# Patient Record
Sex: FEMALE | Race: WHITE | NOT HISPANIC OR LATINO | Employment: OTHER | URBAN - NONMETROPOLITAN AREA
[De-identification: names, ages, dates, MRNs, and addresses within clinical notes are randomized per-mention and may not be internally consistent; named-entity substitution may affect disease eponyms.]

---

## 2024-03-21 ENCOUNTER — HOSPITAL ENCOUNTER (EMERGENCY)
Facility: HOSPITAL | Age: 89
Discharge: HOME | End: 2024-03-21
Attending: EMERGENCY MEDICINE
Payer: MEDICARE

## 2024-03-21 VITALS
WEIGHT: 152.12 LBS | HEART RATE: 91 BPM | SYSTOLIC BLOOD PRESSURE: 96 MMHG | TEMPERATURE: 98.1 F | HEIGHT: 66 IN | OXYGEN SATURATION: 100 % | RESPIRATION RATE: 16 BRPM | DIASTOLIC BLOOD PRESSURE: 56 MMHG | BODY MASS INDEX: 24.45 KG/M2

## 2024-03-21 DIAGNOSIS — M25.511 RIGHT SHOULDER PAIN, UNSPECIFIED CHRONICITY: Primary | ICD-10-CM

## 2024-03-21 PROCEDURE — 99281 EMR DPT VST MAYX REQ PHY/QHP: CPT | Performed by: EMERGENCY MEDICINE

## 2024-03-21 ASSESSMENT — PAIN DESCRIPTION - LOCATION: LOCATION: HAND

## 2024-03-21 ASSESSMENT — PAIN DESCRIPTION - FREQUENCY: FREQUENCY: CONSTANT/CONTINUOUS

## 2024-03-21 ASSESSMENT — PAIN - FUNCTIONAL ASSESSMENT: PAIN_FUNCTIONAL_ASSESSMENT: 0-10

## 2024-03-21 ASSESSMENT — PAIN DESCRIPTION - PAIN TYPE: TYPE: ACUTE PAIN

## 2024-03-21 ASSESSMENT — PAIN SCALES - GENERAL: PAINLEVEL_OUTOF10: 6

## 2024-03-21 NOTE — ED PROVIDER NOTES
HPI   Chief Complaint   Patient presents with    Wrist Injury     Sent by [facility] for a wrist fracture       HPI                    [place] Coma Scale Score: 15                     Patient History   No past medical history on file.  No past surgical history on file.  No family history on file.  Social History     Tobacco Use    Smoking status: Not on file    Smokeless tobacco: Not on file   Substance Use Topics    Alcohol use: Not on file    Drug use: Not on file       Physical Exam   ED Triage Vitals [03/21/24 1730]   Temperature Heart Rate Respirations BP   36.7 °C (98.1 °F) 91 16 96/56      Pulse Ox Temp Source Heart Rate Source Patient Position   100 % Temporal Monitor Sitting      BP Location FiO2 (%)     Left arm --       Physical Exam  Vitals and nursing note reviewed.   Constitutional:       General: She is not in acute distress.     Appearance: She is well-developed.   HENT:      Head: Normocephalic and atraumatic.   Eyes:      Conjunctiva/sclera: Conjunctivae normal.   Cardiovascular:      Rate and Rhythm: Normal rate and regular rhythm.      Heart sounds: No murmur heard.  Pulmonary:      Effort: Pulmonary effort is normal. No respiratory distress.      Breath sounds: Normal breath sounds.   Abdominal:      Palpations: Abdomen is soft.      Tenderness: There is no abdominal tenderness.   Musculoskeletal:         General: No swelling.      Cervical back: Neck supple.   Skin:     General: Skin is warm and dry.      Capillary Refill: Capillary refill takes less than 2 seconds.   Neurological:      Mental Status: She is alert.   Psychiatric:         Mood and Affect: Mood normal.         ED Course & MDM   Diagnoses as of 03/21/24 1751   Right shoulder pain, unspecified chronicity       Medical Decision Making  89-year-old female sent to the ER from [facility] secondary to a positive fracture on the outpatient x-rays.  According to the outpatient x-rays patient has a radial head fracture.  However  clinically patient has no tenderness at all she is able to probe pronate supinate externally rotate internally rotate complete range of motion of her right upper extremity.  Clinically has no point tenderness on the radial head.  Patient is able to grab my hand and pull me down to her.  Contacted orthopedics with a reported to me as long as the patient is not having any difficulty with range of motion or any indication of pain that there is no reason to reimage the patient.  Could give a arm sling for comfort but again with the clinical exam patient does not have evidence of any fracture.  Patient be discharged back to the unit.        Procedure  Procedures     Shree Burns, DO  03/21/24 3794

## 2024-04-03 ENCOUNTER — APPOINTMENT (OUTPATIENT)
Dept: RADIOLOGY | Facility: HOSPITAL | Age: 89
End: 2024-04-03
Payer: MEDICARE

## 2024-04-03 ENCOUNTER — APPOINTMENT (OUTPATIENT)
Dept: CARDIOLOGY | Facility: HOSPITAL | Age: 89
End: 2024-04-03
Payer: MEDICARE

## 2024-04-03 ENCOUNTER — HOSPITAL ENCOUNTER (EMERGENCY)
Facility: HOSPITAL | Age: 89
Discharge: OTHER INSTITUTION | End: 2024-04-04
Attending: STUDENT IN AN ORGANIZED HEALTH CARE EDUCATION/TRAINING PROGRAM
Payer: MEDICARE

## 2024-04-03 DIAGNOSIS — R79.89 ELEVATED TROPONIN: ICD-10-CM

## 2024-04-03 DIAGNOSIS — R53.1 GENERALIZED WEAKNESS: Primary | ICD-10-CM

## 2024-04-03 LAB
ALBUMIN SERPL BCP-MCNC: 3.2 G/DL (ref 3.4–5)
ALP SERPL-CCNC: 65 U/L (ref 33–136)
ALT SERPL W P-5'-P-CCNC: 29 U/L (ref 7–45)
ANION GAP SERPL CALC-SCNC: 11 MMOL/L (ref 10–20)
APPEARANCE UR: ABNORMAL
AST SERPL W P-5'-P-CCNC: 54 U/L (ref 9–39)
BASOPHILS # BLD AUTO: 0.07 X10*3/UL (ref 0–0.1)
BASOPHILS NFR BLD AUTO: 0.4 %
BILIRUB SERPL-MCNC: 0.4 MG/DL (ref 0–1.2)
BILIRUB UR STRIP.AUTO-MCNC: NEGATIVE MG/DL
BNP SERPL-MCNC: 557 PG/ML (ref 0–99)
BUN SERPL-MCNC: 35 MG/DL (ref 6–23)
CALCIUM SERPL-MCNC: 9.2 MG/DL (ref 8.6–10.3)
CARDIAC TROPONIN I PNL SERPL HS: 241 NG/L (ref 0–13)
CARDIAC TROPONIN I PNL SERPL HS: 304 NG/L (ref 0–13)
CARDIAC TROPONIN I PNL SERPL HS: 337 NG/L (ref 0–13)
CHLORIDE SERPL-SCNC: 105 MMOL/L (ref 98–107)
CO2 SERPL-SCNC: 30 MMOL/L (ref 21–32)
COLOR UR: YELLOW
CREAT SERPL-MCNC: 0.95 MG/DL (ref 0.5–1.05)
EGFRCR SERPLBLD CKD-EPI 2021: 57 ML/MIN/1.73M*2
EOSINOPHIL # BLD AUTO: 0.04 X10*3/UL (ref 0–0.4)
EOSINOPHIL NFR BLD AUTO: 0.2 %
ERYTHROCYTE [DISTWIDTH] IN BLOOD BY AUTOMATED COUNT: 15 % (ref 11.5–14.5)
FLUAV RNA RESP QL NAA+PROBE: NOT DETECTED
FLUBV RNA RESP QL NAA+PROBE: NOT DETECTED
GLUCOSE SERPL-MCNC: 128 MG/DL (ref 74–99)
GLUCOSE UR STRIP.AUTO-MCNC: NEGATIVE MG/DL
HCT VFR BLD AUTO: 31.9 % (ref 36–46)
HGB BLD-MCNC: 10.1 G/DL (ref 12–16)
HOLD SPECIMEN: NORMAL
IMM GRANULOCYTES # BLD AUTO: 0.07 X10*3/UL (ref 0–0.5)
IMM GRANULOCYTES NFR BLD AUTO: 0.4 % (ref 0–0.9)
INR PPP: 1.4 (ref 0.9–1.1)
KETONES UR STRIP.AUTO-MCNC: NEGATIVE MG/DL
LACTATE SERPL-SCNC: 1.8 MMOL/L (ref 0.4–2)
LACTATE SERPL-SCNC: 2.3 MMOL/L (ref 0.4–2)
LEUKOCYTE ESTERASE UR QL STRIP.AUTO: NEGATIVE
LIPASE SERPL-CCNC: 4 U/L (ref 9–82)
LYMPHOCYTES # BLD AUTO: 0.97 X10*3/UL (ref 0.8–3)
LYMPHOCYTES NFR BLD AUTO: 5.9 %
MAGNESIUM SERPL-MCNC: 1.06 MG/DL (ref 1.6–2.4)
MCH RBC QN AUTO: 28.4 PG (ref 26–34)
MCHC RBC AUTO-ENTMCNC: 31.7 G/DL (ref 32–36)
MCV RBC AUTO: 90 FL (ref 80–100)
MONOCYTES # BLD AUTO: 1.04 X10*3/UL (ref 0.05–0.8)
MONOCYTES NFR BLD AUTO: 6.3 %
NEUTROPHILS # BLD AUTO: 14.32 X10*3/UL (ref 1.6–5.5)
NEUTROPHILS NFR BLD AUTO: 86.8 %
NITRITE UR QL STRIP.AUTO: NEGATIVE
NRBC BLD-RTO: 0 /100 WBCS (ref 0–0)
PH UR STRIP.AUTO: 5 [PH]
PLATELET # BLD AUTO: 332 X10*3/UL (ref 150–450)
POTASSIUM SERPL-SCNC: 3.1 MMOL/L (ref 3.5–5.3)
PROT SERPL-MCNC: 6.1 G/DL (ref 6.4–8.2)
PROT UR STRIP.AUTO-MCNC: NEGATIVE MG/DL
PROTHROMBIN TIME: 15.9 SECONDS (ref 9.8–12.8)
RBC # BLD AUTO: 3.56 X10*6/UL (ref 4–5.2)
RBC # UR STRIP.AUTO: NEGATIVE /UL
SARS-COV-2 RNA RESP QL NAA+PROBE: NOT DETECTED
SODIUM SERPL-SCNC: 143 MMOL/L (ref 136–145)
SP GR UR STRIP.AUTO: 1.01
UROBILINOGEN UR STRIP.AUTO-MCNC: <2 MG/DL
WBC # BLD AUTO: 16.5 X10*3/UL (ref 4.4–11.3)

## 2024-04-03 PROCEDURE — 80053 COMPREHEN METABOLIC PANEL: CPT | Performed by: HEALTH CARE PROVIDER

## 2024-04-03 PROCEDURE — 96367 TX/PROPH/DG ADDL SEQ IV INF: CPT

## 2024-04-03 PROCEDURE — 85610 PROTHROMBIN TIME: CPT | Performed by: HEALTH CARE PROVIDER

## 2024-04-03 PROCEDURE — 96366 THER/PROPH/DIAG IV INF ADDON: CPT

## 2024-04-03 PROCEDURE — 93005 ELECTROCARDIOGRAM TRACING: CPT

## 2024-04-03 PROCEDURE — 96368 THER/DIAG CONCURRENT INF: CPT | Mod: 59

## 2024-04-03 PROCEDURE — 85025 COMPLETE CBC W/AUTO DIFF WBC: CPT | Performed by: HEALTH CARE PROVIDER

## 2024-04-03 PROCEDURE — 81003 URINALYSIS AUTO W/O SCOPE: CPT | Performed by: HEALTH CARE PROVIDER

## 2024-04-03 PROCEDURE — 2500000004 HC RX 250 GENERAL PHARMACY W/ HCPCS (ALT 636 FOR OP/ED): Performed by: HEALTH CARE PROVIDER

## 2024-04-03 PROCEDURE — 71275 CT ANGIOGRAPHY CHEST: CPT | Mod: FOREIGN READ | Performed by: RADIOLOGY

## 2024-04-03 PROCEDURE — 71045 X-RAY EXAM CHEST 1 VIEW: CPT | Mod: FOREIGN READ | Performed by: RADIOLOGY

## 2024-04-03 PROCEDURE — 96365 THER/PROPH/DIAG IV INF INIT: CPT | Mod: 59

## 2024-04-03 PROCEDURE — 96375 TX/PRO/DX INJ NEW DRUG ADDON: CPT | Mod: 59

## 2024-04-03 PROCEDURE — 83605 ASSAY OF LACTIC ACID: CPT | Performed by: HEALTH CARE PROVIDER

## 2024-04-03 PROCEDURE — 36415 COLL VENOUS BLD VENIPUNCTURE: CPT | Performed by: HEALTH CARE PROVIDER

## 2024-04-03 PROCEDURE — 96361 HYDRATE IV INFUSION ADD-ON: CPT

## 2024-04-03 PROCEDURE — 84484 ASSAY OF TROPONIN QUANT: CPT | Performed by: HEALTH CARE PROVIDER

## 2024-04-03 PROCEDURE — 83735 ASSAY OF MAGNESIUM: CPT | Performed by: HEALTH CARE PROVIDER

## 2024-04-03 PROCEDURE — 74177 CT ABD & PELVIS W/CONTRAST: CPT

## 2024-04-03 PROCEDURE — 71045 X-RAY EXAM CHEST 1 VIEW: CPT

## 2024-04-03 PROCEDURE — 74177 CT ABD & PELVIS W/CONTRAST: CPT | Mod: FOREIGN READ | Performed by: RADIOLOGY

## 2024-04-03 PROCEDURE — P9612 CATHETERIZE FOR URINE SPEC: HCPCS

## 2024-04-03 PROCEDURE — 83880 ASSAY OF NATRIURETIC PEPTIDE: CPT | Performed by: HEALTH CARE PROVIDER

## 2024-04-03 PROCEDURE — 83690 ASSAY OF LIPASE: CPT | Performed by: HEALTH CARE PROVIDER

## 2024-04-03 PROCEDURE — 87040 BLOOD CULTURE FOR BACTERIA: CPT | Mod: GENLAB | Performed by: HEALTH CARE PROVIDER

## 2024-04-03 PROCEDURE — 2550000001 HC RX 255 CONTRASTS: Performed by: HEALTH CARE PROVIDER

## 2024-04-03 PROCEDURE — 71275 CT ANGIOGRAPHY CHEST: CPT

## 2024-04-03 PROCEDURE — 84484 ASSAY OF TROPONIN QUANT: CPT | Performed by: STUDENT IN AN ORGANIZED HEALTH CARE EDUCATION/TRAINING PROGRAM

## 2024-04-03 PROCEDURE — 99285 EMERGENCY DEPT VISIT HI MDM: CPT | Mod: 25

## 2024-04-03 PROCEDURE — 87636 SARSCOV2 & INF A&B AMP PRB: CPT | Performed by: HEALTH CARE PROVIDER

## 2024-04-03 RX ORDER — GLIPIZIDE 5 MG/1
5 TABLET ORAL DAILY
COMMUNITY

## 2024-04-03 RX ORDER — LIDOCAINE 560 MG/1
1 PATCH PERCUTANEOUS; TOPICAL; TRANSDERMAL DAILY
COMMUNITY

## 2024-04-03 RX ORDER — BUSPIRONE HYDROCHLORIDE 10 MG/1
10 TABLET ORAL DAILY
Status: ON HOLD | COMMUNITY
End: 2024-04-04 | Stop reason: WASHOUT

## 2024-04-03 RX ORDER — KETOROLAC TROMETHAMINE 15 MG/ML
7.5 INJECTION, SOLUTION INTRAMUSCULAR; INTRAVENOUS ONCE
Status: COMPLETED | OUTPATIENT
Start: 2024-04-03 | End: 2024-04-03

## 2024-04-03 RX ORDER — PANTOPRAZOLE SODIUM 20 MG/1
20 TABLET, DELAYED RELEASE ORAL
COMMUNITY

## 2024-04-03 RX ORDER — POTASSIUM CHLORIDE 14.9 MG/ML
20 INJECTION INTRAVENOUS
Status: COMPLETED | OUTPATIENT
Start: 2024-04-03 | End: 2024-04-03

## 2024-04-03 RX ORDER — LOSARTAN POTASSIUM 25 MG/1
25 TABLET ORAL DAILY
COMMUNITY

## 2024-04-03 RX ORDER — FUROSEMIDE 40 MG/1
40 TABLET ORAL DAILY
COMMUNITY

## 2024-04-03 RX ORDER — BUSPIRONE HYDROCHLORIDE 10 MG/1
20 TABLET ORAL 2 TIMES DAILY
COMMUNITY

## 2024-04-03 RX ORDER — SUCRALFATE 1 G/1
1 TABLET ORAL
COMMUNITY

## 2024-04-03 RX ORDER — TRAZODONE HYDROCHLORIDE 50 MG/1
50 TABLET ORAL NIGHTLY
COMMUNITY

## 2024-04-03 RX ORDER — TALC
3 POWDER (GRAM) TOPICAL NIGHTLY
COMMUNITY

## 2024-04-03 RX ORDER — METFORMIN HYDROCHLORIDE 1000 MG/1
1000 TABLET ORAL
COMMUNITY

## 2024-04-03 RX ORDER — SERTRALINE HYDROCHLORIDE 50 MG/1
50 TABLET, FILM COATED ORAL DAILY
COMMUNITY

## 2024-04-03 RX ORDER — VANCOMYCIN HYDROCHLORIDE 1 G/200ML
1000 INJECTION, SOLUTION INTRAVENOUS ONCE
Status: COMPLETED | OUTPATIENT
Start: 2024-04-03 | End: 2024-04-03

## 2024-04-03 RX ORDER — QUETIAPINE FUMARATE 50 MG/1
50 TABLET, FILM COATED ORAL 2 TIMES DAILY
COMMUNITY

## 2024-04-03 RX ORDER — MAGNESIUM SULFATE HEPTAHYDRATE 40 MG/ML
2 INJECTION, SOLUTION INTRAVENOUS ONCE
Status: COMPLETED | OUTPATIENT
Start: 2024-04-03 | End: 2024-04-03

## 2024-04-03 RX ADMIN — IOHEXOL 75 ML: 350 INJECTION, SOLUTION INTRAVENOUS at 13:39

## 2024-04-03 RX ADMIN — SODIUM CHLORIDE, POTASSIUM CHLORIDE, SODIUM LACTATE AND CALCIUM CHLORIDE 500 ML: 600; 310; 30; 20 INJECTION, SOLUTION INTRAVENOUS at 11:52

## 2024-04-03 RX ADMIN — MAGNESIUM SULFATE IN WATER 2 G: 40 INJECTION, SOLUTION INTRAVENOUS at 13:12

## 2024-04-03 RX ADMIN — POTASSIUM CHLORIDE 20 MEQ: 14.9 INJECTION, SOLUTION INTRAVENOUS at 13:09

## 2024-04-03 RX ADMIN — POTASSIUM CHLORIDE 20 MEQ: 14.9 INJECTION, SOLUTION INTRAVENOUS at 15:36

## 2024-04-03 RX ADMIN — KETOROLAC TROMETHAMINE 7.5 MG: 15 INJECTION, SOLUTION INTRAMUSCULAR; INTRAVENOUS at 21:25

## 2024-04-03 RX ADMIN — VANCOMYCIN HYDROCHLORIDE 1000 MG: 1 INJECTION, SOLUTION INTRAVENOUS at 13:50

## 2024-04-03 ASSESSMENT — COLUMBIA-SUICIDE SEVERITY RATING SCALE - C-SSRS
2. HAVE YOU ACTUALLY HAD ANY THOUGHTS OF KILLING YOURSELF?: NO
6. HAVE YOU EVER DONE ANYTHING, STARTED TO DO ANYTHING, OR PREPARED TO DO ANYTHING TO END YOUR LIFE?: NO
1. IN THE PAST MONTH, HAVE YOU WISHED YOU WERE DEAD OR WISHED YOU COULD GO TO SLEEP AND NOT WAKE UP?: NO

## 2024-04-03 ASSESSMENT — PAIN - FUNCTIONAL ASSESSMENT
PAIN_FUNCTIONAL_ASSESSMENT: 0-10
PAIN_FUNCTIONAL_ASSESSMENT: UNABLE TO SELF-REPORT

## 2024-04-03 ASSESSMENT — PAIN SCALES - GENERAL: PAINLEVEL_OUTOF10: 0 - NO PAIN

## 2024-04-03 NOTE — ED PROVIDER NOTES
HPI   Chief Complaint   Patient presents with    Abdominal Pain    abnormal labs-high neutrophil       CC: Increased lethargy  HPI:   This is a 89-year-old female from [facility] inpatient psychiatry facility for increased lethargy possible abdominal tenderness and outpatient laboratory finding for increased leukocytosis.  Patient has a history of dementia with psychosis, she was placed at [facility] on 3/12/2024 per her EMR she has a history of renal cell carcinoma with partial nephrectomy, non-insulin-dependent type 2 diabetes, prior cholecystectomy, she has a history of Crohn's, A-fib, she is normally ANO x 1 which is her normal baseline.      Additional Limitations to History:   External Records Reviewed: I reviewed recent and relevant outside records including   History Obtained From:     Past Medical History: Per HPI  Medications: Reviewed in EMR and with patient  Allergies:  Reviewed in EMR  Past Surgical History:   Social History:     ------------------------------------------------------------------------------------------------------  Physical Exam:  --Vital signs reviewed in nursing triage note, EMR flow sheets, and at patient's bedside  GEN:  A&Ox3, no acute distress, appears comfortable.   EYES: EOMI, non-injected sclera.  ENT: Moist mucous membranes, no apparent injuries or lesions.   CARDIO: Normal rate and regular rhythm. No murmurs, rubs, or gallops.  2+ equal pulses of the distal extremities.   PULM: Clear to auscultation bilaterally. No rales, rhonchi, or wheezes. Good symmetric chest expansion.  GI: Soft, non-tender, non-distended. No rebound tenderness or guarding.  SKIN: Warm and dry, no rashes or lesions.  MSK: ROM intact the extremities without contractures.   EXT: No peripheral edema, contusions, or wounds.   NEURO:    Medical Decision Making:  Patient seen and evaluated by ED attending. On arrival the patient     Differential Diagnoses Considered:   Chronic Medical Conditions  Significantly Affecting Care:   Diagnostic testing considered: [PERC, D-Dimer, PECARN, etc.]    - EKG interpreted by myself (atrial fibrillation rate controlled at 97 QRS duration 124 no prolonged QT/QTc no obvious ST elevation, depression or acute ischemic findings.  - I independently interpreted: [CXR, CT, POCUS, etc. including your interpretation]  - Labs notable for     Escalation of Care: Appropriate for   Social Determinants of Health Significantly Affecting Care: [Homelessness, lacking transportation, uninsured, unable to afford medications]  Prescription Drug Consideration: [Antibiotics, antivirals, pain medications, etc.]  Discussion of Management with Other Providers:  I discussed the patient/results with: [admitting team, consultant, radiologist, social work, EPAT, case management, PT/OT, RT, PCP, etc.]      Camden cha I now it is                          [place] Coma Scale Score: 13                     Patient History   No past medical history on file.  No past surgical history on file.  No family history on file.  Social History     Tobacco Use    Smoking status: Not on file    Smokeless tobacco: Not on file   Substance Use Topics    Alcohol use: Not on file    Drug use: Not on file       Physical Exam   ED Triage Vitals [04/03/24 1123]   Temperature Heart Rate Respirations BP   35.8 °C (96.5 °F) 97 19 117/70      Pulse Ox Temp Source Heart Rate Source Patient Position   96 % Temporal -- --      BP Location FiO2 (%)     -- --       Physical Exam    ED Course & MDM   Diagnoses as of 04/04/24 1322   Generalized weakness   Elevated troponin       Medical Decision Making  89-year-old female from geriatric long-term nursing facility with nonspecific complaints of abdominal pain, abnormal findings on laboratory workup.  Family is at bedside was able to provide some more information, workup here in the ED showed leukocytosis over 16,000, elevated troponins, negative influenza COVID, her troponins  are trending downward, electrolyte imbalance with hypomagnesia him hypokalemia elevated BNP CT imaging showing moderate amount of stool, fecal impaction, CT chest negative for PEs or pneumonia urine also appeared to be unremarkable.  No acute ischemic changes noted on ECG, patient will need to be transferred to higher level of care unable to keep geriatric psychiatric patient at this facility, report given to Dr. Caban        Procedure  Procedures     Camden Monk PA-C  04/04/24 5429

## 2024-04-04 ENCOUNTER — TELEPHONE (OUTPATIENT)
Dept: EMERGENCY MEDICINE | Facility: HOSPITAL | Age: 89
End: 2024-04-04
Payer: MEDICARE

## 2024-04-04 ENCOUNTER — HOSPITAL ENCOUNTER (OUTPATIENT)
Facility: HOSPITAL | Age: 89
Setting detail: OBSERVATION
Discharge: PSYCHIATRIC OR SUBSTANCE USE FACILITY | End: 2024-04-05
Attending: INTERNAL MEDICINE | Admitting: INTERNAL MEDICINE
Payer: MEDICARE

## 2024-04-04 VITALS
OXYGEN SATURATION: 95 % | RESPIRATION RATE: 23 BRPM | HEIGHT: 66 IN | HEART RATE: 103 BPM | SYSTOLIC BLOOD PRESSURE: 111 MMHG | BODY MASS INDEX: 24.45 KG/M2 | DIASTOLIC BLOOD PRESSURE: 65 MMHG | TEMPERATURE: 98.1 F | WEIGHT: 152.12 LBS

## 2024-04-04 DIAGNOSIS — K50.919 CROHN'S DISEASE WITH COMPLICATION, UNSPECIFIED GASTROINTESTINAL TRACT LOCATION (MULTI): Primary | ICD-10-CM

## 2024-04-04 DIAGNOSIS — K59.00 CONSTIPATION, UNSPECIFIED CONSTIPATION TYPE: ICD-10-CM

## 2024-04-04 PROBLEM — E86.0 DEHYDRATION: Status: ACTIVE | Noted: 2024-04-04

## 2024-04-04 LAB
ANION GAP SERPL CALC-SCNC: 12 MMOL/L (ref 10–20)
BUN SERPL-MCNC: 25 MG/DL (ref 6–23)
CALCIUM SERPL-MCNC: 8.6 MG/DL (ref 8.6–10.3)
CHLORIDE SERPL-SCNC: 104 MMOL/L (ref 98–107)
CO2 SERPL-SCNC: 29 MMOL/L (ref 21–32)
CREAT SERPL-MCNC: 0.75 MG/DL (ref 0.5–1.05)
EGFRCR SERPLBLD CKD-EPI 2021: 75 ML/MIN/1.73M*2
ERYTHROCYTE [DISTWIDTH] IN BLOOD BY AUTOMATED COUNT: 14.8 % (ref 11.5–14.5)
EST. AVERAGE GLUCOSE BLD GHB EST-MCNC: 131 MG/DL
GLUCOSE BLD MANUAL STRIP-MCNC: 106 MG/DL (ref 74–99)
GLUCOSE BLD MANUAL STRIP-MCNC: 131 MG/DL (ref 74–99)
GLUCOSE BLD MANUAL STRIP-MCNC: 171 MG/DL (ref 74–99)
GLUCOSE BLD MANUAL STRIP-MCNC: 92 MG/DL (ref 74–99)
GLUCOSE SERPL-MCNC: 103 MG/DL (ref 74–99)
HBA1C MFR BLD: 6.2 %
HCT VFR BLD AUTO: 31.3 % (ref 36–46)
HGB BLD-MCNC: 9.9 G/DL (ref 12–16)
MAGNESIUM SERPL-MCNC: 2.59 MG/DL (ref 1.6–2.4)
MCH RBC QN AUTO: 28.6 PG (ref 26–34)
MCHC RBC AUTO-ENTMCNC: 31.6 G/DL (ref 32–36)
MCV RBC AUTO: 91 FL (ref 80–100)
NRBC BLD-RTO: 0 /100 WBCS (ref 0–0)
PHOSPHATE SERPL-MCNC: 2.1 MG/DL (ref 2.5–4.9)
PLATELET # BLD AUTO: 316 X10*3/UL (ref 150–450)
POTASSIUM SERPL-SCNC: 2.9 MMOL/L (ref 3.5–5.3)
RBC # BLD AUTO: 3.46 X10*6/UL (ref 4–5.2)
SODIUM SERPL-SCNC: 142 MMOL/L (ref 136–145)
WBC # BLD AUTO: 11.6 X10*3/UL (ref 4.4–11.3)

## 2024-04-04 PROCEDURE — 83735 ASSAY OF MAGNESIUM: CPT

## 2024-04-04 PROCEDURE — G0378 HOSPITAL OBSERVATION PER HR: HCPCS

## 2024-04-04 PROCEDURE — 2500000004 HC RX 250 GENERAL PHARMACY W/ HCPCS (ALT 636 FOR OP/ED)

## 2024-04-04 PROCEDURE — 2500000001 HC RX 250 WO HCPCS SELF ADMINISTERED DRUGS (ALT 637 FOR MEDICARE OP)

## 2024-04-04 PROCEDURE — 96376 TX/PRO/DX INJ SAME DRUG ADON: CPT

## 2024-04-04 PROCEDURE — 80048 BASIC METABOLIC PNL TOTAL CA: CPT

## 2024-04-04 PROCEDURE — 36415 COLL VENOUS BLD VENIPUNCTURE: CPT

## 2024-04-04 PROCEDURE — 84100 ASSAY OF PHOSPHORUS: CPT

## 2024-04-04 PROCEDURE — 85027 COMPLETE CBC AUTOMATED: CPT

## 2024-04-04 PROCEDURE — 96372 THER/PROPH/DIAG INJ SC/IM: CPT | Mod: 59

## 2024-04-04 PROCEDURE — 92610 EVALUATE SWALLOWING FUNCTION: CPT | Mod: GN

## 2024-04-04 PROCEDURE — 99222 1ST HOSP IP/OBS MODERATE 55: CPT | Performed by: STUDENT IN AN ORGANIZED HEALTH CARE EDUCATION/TRAINING PROGRAM

## 2024-04-04 PROCEDURE — 2500000005 HC RX 250 GENERAL PHARMACY W/O HCPCS

## 2024-04-04 PROCEDURE — 2500000004 HC RX 250 GENERAL PHARMACY W/ HCPCS (ALT 636 FOR OP/ED): Performed by: INTERNAL MEDICINE

## 2024-04-04 PROCEDURE — 2500000002 HC RX 250 W HCPCS SELF ADMINISTERED DRUGS (ALT 637 FOR MEDICARE OP, ALT 636 FOR OP/ED)

## 2024-04-04 PROCEDURE — 96366 THER/PROPH/DIAG IV INF ADDON: CPT

## 2024-04-04 PROCEDURE — 96375 TX/PRO/DX INJ NEW DRUG ADDON: CPT

## 2024-04-04 PROCEDURE — 82947 ASSAY GLUCOSE BLOOD QUANT: CPT | Mod: 59

## 2024-04-04 PROCEDURE — 83036 HEMOGLOBIN GLYCOSYLATED A1C: CPT | Mod: GEALAB

## 2024-04-04 PROCEDURE — 96367 TX/PROPH/DG ADDL SEQ IV INF: CPT

## 2024-04-04 PROCEDURE — 96365 THER/PROPH/DIAG IV INF INIT: CPT

## 2024-04-04 RX ORDER — METFORMIN HYDROCHLORIDE 500 MG/1
1000 TABLET, EXTENDED RELEASE ORAL
Status: DISCONTINUED | OUTPATIENT
Start: 2024-04-04 | End: 2024-04-05

## 2024-04-04 RX ORDER — DEXTROSE 50 % IN WATER (D50W) INTRAVENOUS SYRINGE
25
Status: DISCONTINUED | OUTPATIENT
Start: 2024-04-04 | End: 2024-04-05 | Stop reason: HOSPADM

## 2024-04-04 RX ORDER — BUSPIRONE HYDROCHLORIDE 10 MG/1
10 TABLET ORAL EVERY EVENING
COMMUNITY

## 2024-04-04 RX ORDER — MAGNESIUM SULFATE HEPTAHYDRATE 40 MG/ML
4 INJECTION, SOLUTION INTRAVENOUS ONCE
Status: COMPLETED | OUTPATIENT
Start: 2024-04-04 | End: 2024-04-04

## 2024-04-04 RX ORDER — AMOXICILLIN 250 MG
2 CAPSULE ORAL 2 TIMES DAILY
Status: DISCONTINUED | OUTPATIENT
Start: 2024-04-04 | End: 2024-04-05 | Stop reason: HOSPADM

## 2024-04-04 RX ORDER — INSULIN LISPRO 100 [IU]/ML
0-15 INJECTION, SOLUTION INTRAVENOUS; SUBCUTANEOUS
Status: DISCONTINUED | OUTPATIENT
Start: 2024-04-04 | End: 2024-04-05 | Stop reason: HOSPADM

## 2024-04-04 RX ORDER — METOPROLOL TARTRATE 1 MG/ML
5 INJECTION, SOLUTION INTRAVENOUS ONCE
Status: COMPLETED | OUTPATIENT
Start: 2024-04-04 | End: 2024-04-04

## 2024-04-04 RX ORDER — SERTRALINE HYDROCHLORIDE 50 MG/1
50 TABLET, FILM COATED ORAL DAILY
Status: DISCONTINUED | OUTPATIENT
Start: 2024-04-04 | End: 2024-04-05 | Stop reason: HOSPADM

## 2024-04-04 RX ORDER — POLYETHYLENE GLYCOL 3350 17 G/17G
17 POWDER, FOR SOLUTION ORAL DAILY
Status: DISCONTINUED | OUTPATIENT
Start: 2024-04-04 | End: 2024-04-05 | Stop reason: HOSPADM

## 2024-04-04 RX ORDER — SUCRALFATE 1 G/1
1 TABLET ORAL
Status: DISCONTINUED | OUTPATIENT
Start: 2024-04-04 | End: 2024-04-05 | Stop reason: HOSPADM

## 2024-04-04 RX ORDER — LOSARTAN POTASSIUM 50 MG/1
25 TABLET ORAL DAILY
Status: DISCONTINUED | OUTPATIENT
Start: 2024-04-04 | End: 2024-04-05 | Stop reason: HOSPADM

## 2024-04-04 RX ORDER — POTASSIUM CHLORIDE 14.9 MG/ML
20 INJECTION INTRAVENOUS
Status: COMPLETED | OUTPATIENT
Start: 2024-04-04 | End: 2024-04-04

## 2024-04-04 RX ORDER — POTASSIUM CHLORIDE 20 MEQ/1
40 TABLET, EXTENDED RELEASE ORAL 3 TIMES DAILY
Status: DISCONTINUED | OUTPATIENT
Start: 2024-04-04 | End: 2024-04-04

## 2024-04-04 RX ORDER — ENOXAPARIN SODIUM 100 MG/ML
40 INJECTION SUBCUTANEOUS EVERY 24 HOURS
Status: DISCONTINUED | OUTPATIENT
Start: 2024-04-04 | End: 2024-04-05 | Stop reason: HOSPADM

## 2024-04-04 RX ORDER — LORAZEPAM 2 MG/ML
0.5 INJECTION INTRAMUSCULAR ONCE
Status: COMPLETED | OUTPATIENT
Start: 2024-04-04 | End: 2024-04-04

## 2024-04-04 RX ORDER — BISACODYL 5 MG
10 TABLET, DELAYED RELEASE (ENTERIC COATED) ORAL DAILY PRN
Status: DISCONTINUED | OUTPATIENT
Start: 2024-04-04 | End: 2024-04-05 | Stop reason: HOSPADM

## 2024-04-04 RX ORDER — ONDANSETRON 4 MG/1
4 TABLET, ORALLY DISINTEGRATING ORAL EVERY 8 HOURS PRN
Status: DISCONTINUED | OUTPATIENT
Start: 2024-04-04 | End: 2024-04-05 | Stop reason: HOSPADM

## 2024-04-04 RX ORDER — QUETIAPINE FUMARATE 25 MG/1
50 TABLET, FILM COATED ORAL 2 TIMES DAILY
Status: DISCONTINUED | OUTPATIENT
Start: 2024-04-04 | End: 2024-04-05 | Stop reason: HOSPADM

## 2024-04-04 RX ORDER — BUSPIRONE HYDROCHLORIDE 10 MG/1
10 TABLET ORAL EVERY EVENING
Status: DISCONTINUED | OUTPATIENT
Start: 2024-04-04 | End: 2024-04-05 | Stop reason: HOSPADM

## 2024-04-04 RX ORDER — GLIPIZIDE 5 MG/1
5 TABLET, FILM COATED, EXTENDED RELEASE ORAL DAILY
Status: DISCONTINUED | OUTPATIENT
Start: 2024-04-04 | End: 2024-04-05

## 2024-04-04 RX ORDER — OLANZAPINE 10 MG/2ML
5 INJECTION, POWDER, FOR SOLUTION INTRAMUSCULAR ONCE
Status: COMPLETED | OUTPATIENT
Start: 2024-04-04 | End: 2024-04-04

## 2024-04-04 RX ORDER — FUROSEMIDE 40 MG/1
40 TABLET ORAL DAILY
Status: DISCONTINUED | OUTPATIENT
Start: 2024-04-04 | End: 2024-04-05 | Stop reason: HOSPADM

## 2024-04-04 RX ORDER — POLYETHYLENE GLYCOL 3350 17 G/17G
17 POWDER, FOR SOLUTION ORAL DAILY
Status: DISCONTINUED | OUTPATIENT
Start: 2024-04-04 | End: 2024-04-04

## 2024-04-04 RX ORDER — TRAZODONE HYDROCHLORIDE 50 MG/1
50 TABLET ORAL NIGHTLY
Status: DISCONTINUED | OUTPATIENT
Start: 2024-04-04 | End: 2024-04-05 | Stop reason: HOSPADM

## 2024-04-04 RX ORDER — DEXTROSE 50 % IN WATER (D50W) INTRAVENOUS SYRINGE
12.5
Status: DISCONTINUED | OUTPATIENT
Start: 2024-04-04 | End: 2024-04-05 | Stop reason: HOSPADM

## 2024-04-04 RX ORDER — BUSPIRONE HYDROCHLORIDE 10 MG/1
20 TABLET ORAL 2 TIMES DAILY
Status: DISCONTINUED | OUTPATIENT
Start: 2024-04-04 | End: 2024-04-05 | Stop reason: HOSPADM

## 2024-04-04 RX ORDER — TALC
3 POWDER (GRAM) TOPICAL DAILY
Status: DISCONTINUED | OUTPATIENT
Start: 2024-04-04 | End: 2024-04-05

## 2024-04-04 RX ORDER — ONDANSETRON 4 MG/1
4 TABLET, ORALLY DISINTEGRATING ORAL EVERY 4 HOURS PRN
COMMUNITY

## 2024-04-04 RX ORDER — INSULIN LISPRO 100 [IU]/ML
0-10 INJECTION, SOLUTION INTRAVENOUS; SUBCUTANEOUS
COMMUNITY

## 2024-04-04 RX ORDER — PANTOPRAZOLE SODIUM 20 MG/1
20 TABLET, DELAYED RELEASE ORAL
Status: DISCONTINUED | OUTPATIENT
Start: 2024-04-04 | End: 2024-04-05 | Stop reason: HOSPADM

## 2024-04-04 RX ADMIN — MAGNESIUM SULFATE HEPTAHYDRATE 4 G: 4 INJECTION, SOLUTION INTRAVENOUS at 02:34

## 2024-04-04 RX ADMIN — LORAZEPAM 0.5 MG: 2 INJECTION INTRAMUSCULAR; INTRAVENOUS at 04:28

## 2024-04-04 RX ADMIN — ENOXAPARIN SODIUM 40 MG: 40 INJECTION SUBCUTANEOUS at 02:34

## 2024-04-04 RX ADMIN — POLYETHYLENE GLYCOL 3350 17 G: 17 POWDER, FOR SOLUTION ORAL at 02:34

## 2024-04-04 RX ADMIN — METOPROLOL TARTRATE 5 MG: 5 INJECTION INTRAVENOUS at 22:26

## 2024-04-04 RX ADMIN — TRAZODONE HYDROCHLORIDE 50 MG: 50 TABLET ORAL at 20:28

## 2024-04-04 RX ADMIN — SENNOSIDES AND DOCUSATE SODIUM 2 TABLET: 8.6; 5 TABLET ORAL at 20:28

## 2024-04-04 RX ADMIN — BUSPIRONE HYDROCHLORIDE 20 MG: 10 TABLET ORAL at 20:27

## 2024-04-04 RX ADMIN — METOPROLOL TARTRATE 5 MG: 5 INJECTION INTRAVENOUS at 17:17

## 2024-04-04 RX ADMIN — POTASSIUM CHLORIDE 20 MEQ: 14.9 INJECTION, SOLUTION INTRAVENOUS at 09:22

## 2024-04-04 RX ADMIN — QUETIAPINE FUMARATE 50 MG: 25 TABLET ORAL at 20:27

## 2024-04-04 RX ADMIN — QUETIAPINE FUMARATE 50 MG: 25 TABLET ORAL at 02:34

## 2024-04-04 RX ADMIN — SUCRALFATE 1 G: 1 TABLET ORAL at 20:28

## 2024-04-04 RX ADMIN — BUSPIRONE HYDROCHLORIDE 10 MG: 10 TABLET ORAL at 20:28

## 2024-04-04 RX ADMIN — SUCRALFATE 1 G: 1 TABLET ORAL at 06:16

## 2024-04-04 RX ADMIN — POTASSIUM PHOSPHATE, MONOBASIC AND POTASSIUM PHOSPHATE, DIBASIC 30 MMOL: 224; 236 INJECTION, SOLUTION, CONCENTRATE INTRAVENOUS at 17:17

## 2024-04-04 RX ADMIN — PANTOPRAZOLE SODIUM 20 MG: 20 TABLET, DELAYED RELEASE ORAL at 06:16

## 2024-04-04 RX ADMIN — SENNOSIDES AND DOCUSATE SODIUM 2 TABLET: 8.6; 5 TABLET ORAL at 02:34

## 2024-04-04 RX ADMIN — TRAZODONE HYDROCHLORIDE 50 MG: 50 TABLET ORAL at 02:34

## 2024-04-04 RX ADMIN — OLANZAPINE 5 MG: 10 INJECTION, POWDER, FOR SOLUTION INTRAMUSCULAR at 23:15

## 2024-04-04 RX ADMIN — POTASSIUM CHLORIDE 20 MEQ: 14.9 INJECTION, SOLUTION INTRAVENOUS at 13:24

## 2024-04-04 RX ADMIN — SODIUM CHLORIDE, POTASSIUM CHLORIDE, SODIUM LACTATE AND CALCIUM CHLORIDE 500 ML: 600; 310; 30; 20 INJECTION, SOLUTION INTRAVENOUS at 22:26

## 2024-04-04 RX ADMIN — BUSPIRONE HYDROCHLORIDE 20 MG: 10 TABLET ORAL at 02:34

## 2024-04-04 RX ADMIN — INSULIN LISPRO 3 UNITS: 100 INJECTION, SOLUTION INTRAVENOUS; SUBCUTANEOUS at 20:29

## 2024-04-04 SDOH — SOCIAL STABILITY: SOCIAL INSECURITY: WERE YOU ABLE TO COMPLETE ALL THE BEHAVIORAL HEALTH SCREENINGS?: YES

## 2024-04-04 SDOH — SOCIAL STABILITY: SOCIAL INSECURITY: ABUSE: ADULT

## 2024-04-04 SDOH — SOCIAL STABILITY: SOCIAL INSECURITY: DO YOU FEEL UNSAFE GOING BACK TO THE PLACE WHERE YOU ARE LIVING?: NO

## 2024-04-04 SDOH — SOCIAL STABILITY: SOCIAL INSECURITY: DOES ANYONE TRY TO KEEP YOU FROM HAVING/CONTACTING OTHER FRIENDS OR DOING THINGS OUTSIDE YOUR HOME?: NO

## 2024-04-04 SDOH — SOCIAL STABILITY: SOCIAL INSECURITY: HAVE YOU HAD THOUGHTS OF HARMING ANYONE ELSE?: NO

## 2024-04-04 SDOH — SOCIAL STABILITY: SOCIAL INSECURITY: HAS ANYONE EVER THREATENED TO HURT YOUR FAMILY OR YOUR PETS?: NO

## 2024-04-04 SDOH — SOCIAL STABILITY: SOCIAL INSECURITY: ARE YOU OR HAVE YOU BEEN THREATENED OR ABUSED PHYSICALLY, EMOTIONALLY, OR SEXUALLY BY ANYONE?: NO

## 2024-04-04 SDOH — SOCIAL STABILITY: SOCIAL INSECURITY: ARE THERE ANY APPARENT SIGNS OF INJURIES/BEHAVIORS THAT COULD BE RELATED TO ABUSE/NEGLECT?: NO

## 2024-04-04 SDOH — SOCIAL STABILITY: SOCIAL INSECURITY: DO YOU FEEL ANYONE HAS EXPLOITED OR TAKEN ADVANTAGE OF YOU FINANCIALLY OR OF YOUR PERSONAL PROPERTY?: NO

## 2024-04-04 ASSESSMENT — COGNITIVE AND FUNCTIONAL STATUS - GENERAL
MOVING TO AND FROM BED TO CHAIR: A LOT
PERSONAL GROOMING: A LOT
STANDING UP FROM CHAIR USING ARMS: A LOT
DRESSING REGULAR UPPER BODY CLOTHING: A LOT
DRESSING REGULAR LOWER BODY CLOTHING: A LOT
DAILY ACTIVITIY SCORE: 12
PATIENT BASELINE BEDBOUND: NO
TOILETING: A LOT
MOVING FROM LYING ON BACK TO SITTING ON SIDE OF FLAT BED WITH BEDRAILS: A LOT
TURNING FROM BACK TO SIDE WHILE IN FLAT BAD: A LOT
WALKING IN HOSPITAL ROOM: A LOT
HELP NEEDED FOR BATHING: A LOT
CLIMB 3 TO 5 STEPS WITH RAILING: A LOT
EATING MEALS: A LOT
MOBILITY SCORE: 12

## 2024-04-04 ASSESSMENT — ACTIVITIES OF DAILY LIVING (ADL)
LACK_OF_TRANSPORTATION: NO
ADEQUATE_TO_COMPLETE_ADL: YES
JUDGMENT_ADEQUATE_SAFELY_COMPLETE_DAILY_ACTIVITIES: NO
ASSISTIVE_DEVICE: EYEGLASSES;WALKER;WHEELCHAIR
HEARING - RIGHT EAR: DIFFICULTY WITH NOISE
BATHING: DEPENDENT
LACK_OF_TRANSPORTATION: PATIENT UNABLE TO ANSWER
FEEDING YOURSELF: DEPENDENT
GROOMING: DEPENDENT
HEARING - LEFT EAR: DIFFICULTY WITH NOISE
WALKS IN HOME: DEPENDENT
DRESSING YOURSELF: DEPENDENT
PATIENT'S MEMORY ADEQUATE TO SAFELY COMPLETE DAILY ACTIVITIES?: NO
TOILETING: DEPENDENT

## 2024-04-04 ASSESSMENT — LIFESTYLE VARIABLES
AUDIT-C TOTAL SCORE: 0
HOW OFTEN DO YOU HAVE A DRINK CONTAINING ALCOHOL: NEVER
SKIP TO QUESTIONS 9-10: 1
HOW OFTEN DO YOU HAVE 6 OR MORE DRINKS ON ONE OCCASION: NEVER
AUDIT-C TOTAL SCORE: 0
HOW MANY STANDARD DRINKS CONTAINING ALCOHOL DO YOU HAVE ON A TYPICAL DAY: PATIENT DOES NOT DRINK

## 2024-04-04 ASSESSMENT — PAIN SCALES - PAIN ASSESSMENT IN ADVANCED DEMENTIA (PAINAD)
BODYLANGUAGE: RELAXED
BREATHING: NORMAL
BODYLANGUAGE: RELAXED
BREATHING: NORMAL
FACIALEXPRESSION: SMILING OR INEXPRESSIVE
TOTALSCORE: NO NEED TO CONSOLE
BREATHING: SMILING OR INEXPRESSIVE
TOTALSCORE: 0
CONSOLABILITY: NO NEED TO CONSOLE
TOTALSCORE: 0

## 2024-04-04 ASSESSMENT — PAIN - FUNCTIONAL ASSESSMENT
PAIN_FUNCTIONAL_ASSESSMENT: PAINAD (PAIN ASSESSMENT IN ADVANCED DEMENTIA SCALE)
PAIN_FUNCTIONAL_ASSESSMENT: 0-10
PAIN_FUNCTIONAL_ASSESSMENT: PAINAD (PAIN ASSESSMENT IN ADVANCED DEMENTIA SCALE)

## 2024-04-04 ASSESSMENT — PATIENT HEALTH QUESTIONNAIRE - PHQ9
1. LITTLE INTEREST OR PLEASURE IN DOING THINGS: NEARLY EVERY DAY
SUM OF ALL RESPONSES TO PHQ9 QUESTIONS 1 & 2: 6
2. FEELING DOWN, DEPRESSED OR HOPELESS: NEARLY EVERY DAY

## 2024-04-04 ASSESSMENT — PAIN SCALES - GENERAL
PAINLEVEL_OUTOF10: 0 - NO PAIN

## 2024-04-04 ASSESSMENT — COLUMBIA-SUICIDE SEVERITY RATING SCALE - C-SSRS
6. HAVE YOU EVER DONE ANYTHING, STARTED TO DO ANYTHING, OR PREPARED TO DO ANYTHING TO END YOUR LIFE?: NO
2. HAVE YOU ACTUALLY HAD ANY THOUGHTS OF KILLING YOURSELF?: NO
1. IN THE PAST MONTH, HAVE YOU WISHED YOU WERE DEAD OR WISHED YOU COULD GO TO SLEEP AND NOT WAKE UP?: NO

## 2024-04-04 NOTE — PROGRESS NOTES
04/04/24 1600   Referral Data   Referral Source Other (Comment)   Referral Reason Information   County Information   County [place] Residence Out of state   Emotional/ Psychological   Person Assessed Patient  (JOE left message at "[employer] [place] to determine if pt can return.)     4/5/24:  JOE spoke to Corinna at "[employer] Intake and Sarahi (JOE); pt is able to return to [facility] prior to 4/6 @ 11am.  Confirmed with physicians that pt can discharge today.  Spoke to dtr Nakita and pt's sister Shivani at bedside who are in agreement for return to [facility].  Transport arranged, family, RN, facility & physician updated on plan.

## 2024-04-04 NOTE — PROGRESS NOTES
04/04/24 1017   Discharge Planning   Living Arrangements Other (Comment)  ([facility])   Support Systems Children;Family members   Assistance Needed Patient confused, with history of dementia. Call placed to patient's daughter Rosa to obtain baseline. Per daughter, patient was at [facility] in [place], [place] until the beginning of March and then was moved to [facility]. At baseline patient is A&Ox1, requires assistance with ADL's and uses a wheelchair for ambulation. Prior to going to [facility] the patient was able to use a walker to ambulate to bathroom, per daughter.   Type of Residence Inpatient behavioral health unit/facility   Do you have animals or pets at home? No   Care Facility Name [facility]   Who is requesting discharge planning? Provider   Patient expects to be discharged to: Patient's daughter Ruth would like the patient to return to [facility] at discharge.   Does the patient need discharge transport arranged? Yes   RoundTrip coordination needed? Yes   Has discharge transport been arranged? No   Housing Stability   In the last 12 months, was there a time when you were not able to pay the mortgage or rent on time? Pt Unable   In the last 12 months, how many places have you lived? 2   In the last 12 months, was there a time when you did not have a steady place to sleep or slept in a shelter (including now)? Pt Unable   Transportation Needs   In the past 12 months, has lack of transportation kept you from medical appointments or from getting medications? Pt Unable   In the past 12 months, has lack of transportation kept you from meetings, work, or from getting things needed for daily living? Pt Unable   Patient Choice   Provider Choice list and CMS website (https://medicare.gov/care-compare#search) for post-acute Quality and Resource Measure Data were provided and reviewed with: Family    Patient / Family choosing to utilize agency / facility established prior to hospitalization Yes

## 2024-04-04 NOTE — SIGNIFICANT EVENT
Record Request / Family Update    Was able to meet son Mich at bedside around 4:45pm. Mich stated that patient still appeared more lethargic than her normal baseline. He also noted that patient currently has no healthcare power of  and that daughter Roas has been making all decisions. Dynamics between Mich and Rosa are not well - Rosa is currently pursuing legal action against Mich. Son Mich also previously had power of  for the patient regarding financial decision making. The patient previously had a healthcare power of  paperwork, but shredded/revoked it.     Son confirmed that patient used to seek medical treatment at [facility] ([facility] Will attempt to request records through [facility].     Medical records successfully requested - CEIN number is [id number]. [facility].    Around 5pm, also gave 500cc LR over 4 hours and 5mg Lopressor for tachycardia.     Shree Carroll, DO  Internal Medicine, PGY-I

## 2024-04-04 NOTE — PROGRESS NOTES
Speech-Language Pathology    Speech-Language Pathology Clinical Swallow Evaluation    Patient Name: Viktoriya Lyon  MRN: [id number]  : 10/16/1932  Today's Date: 24  Start time: Start Time: 850  Stop time: Stop Time: 905  Time calculation (min) : Time Calculation (min): 15 min    ASSESSMENT  Impressions:  Mild oral phase and no suspected pharyngeal phase dysphagia based on clinical swallow evaluation.  Prognosis: Good  PLAN  Recommendations:  MBSS recommended: SLP will continue to monitor to determine if MBSS is clinically warranted.  Solid consistency: Pureed (IDDSI level 4)  Liquid consistency: Thin (IDDSI 0)  Medication administration: Crushed, in puree  Compensatory swallow strategies: Upright positioning for all PO intake, Small bites, Small sips, and Total assist for feeding    Recommended frequency/duration:  Skilled SLP services recommended: yes  Frequency: 2x/week  Duration: Current admission  Discharge recommendation: check subsequent notesTreatment/Interventions: Assess diet tolerance  Strengths: N/A  Barriers to participation in tx: Cognition    Goals  Anticipated time frame for goal attainment: 2 weeks:    Goal established 24: Pt will consume prescribed diet (current diet is pureed with thin liquids) without overt s/sx aspiration/penetration in 95% of observed trials.   Status: Goal initiated this date  2.   Goal established 24:  Patient will participate in ongoing evaluation of swallowing functioning to ensure he is on the safest and least restrictive diet level.     Status: Goal initiated this date    SUBJECTIVE    PMHx relevant to rehab: Per HPI: Viktoriya Lyon is a 89 y.o. female w/ PMH of T2DM, anxiety/depression, dementia w/ psychosis, HTN, GERD, CHF, CAD s/p stent placement, A-fib (not on AC), RCC s/p partial nephrectomy, and Crohn's disease who initially presented to [place] from [facility] for abdominal pain and lethargy. Pt is Aox1 which is her baseline. On initial  "evaluation pt was found to have elevated troponin and electrolyte abnormalities and therefore transferred to [facility] for further management. Pt's family- daughter and sister are at bedside  (from out of state) and provided ancillary history as pt is poor historian and not entirely reliable. Pt states she is not currently experiencing any acute symptoms. She reports having CP \"20 hours ago\" which was located in the center of her chest and worsened with movement of her R arm. She has a history of R shoulder surgery for fracture. Pt denies any burning or pain with urination but states she has some pressure sensation in her lower abdominal area. She has not noticed any increase in urinary frequency. She also denies any active CP, SOB, cough, n/v, f/c, Family at bedside states that patient is normally more agitated and currently appears more lethargic than normal. Family states pt doesn't drink much water and usually only consumes coffee. They are from [place] and pt's medical records are within [facility] system. Daughter is going to provide a list of medications from PCP to ensure we have all the accurate medical history including the medication she takes for her Crohns' disease.     Chief complaint: Pt was admitted on 4/4/24 due to change in mental status/ lethargy, electrolyte abnormalities, abdominal pain 2/2 constipation, leukocytosis and elevated troponin.     Relevant imaging results:  4/3/24 chest x ray:   \"IMPRESSION:  Vascular congestion with trace pleural effusions.  Signed by Javed Perez MD\"    General Visit Information:  SLP Received On: 04/04/24  Patient Class: Inpatient  Living Environment: Nursing home (skilled/long-term)  Ordering Physician: Mark Keyes MD  Reason for Referral: Difficulty taking pills last night  Prior to Session Communication: Bedside nurse    RN cleared pt to participate in session and reported that the patient had some mild difficulty taking her pills crushed in applesauce " last evening and the physician recommended further evaluation by SLP.  [employer] Diet: Regular/thin  Current Diet : NPO  Pain Assessment  Pain Assessment: PAINAD (Pain Assessment in Advanced Dementia Scale)  Pain Score: 0 - No pain    Pt was drowsy for session. Pt was lethargic but cooperative. No verbalizations during this session.   Orientation: Unable to answer orientation questions  Ability to follow functional commands: Does not consistently follow commands despite cues  Nutritional status: Appears well-nourished/no concerns    Respiratory status: Room air  Baseline Vocal Quality: Normal  Volitional Cough: Strong  Volitional Swallow: Within Functional Limits  Patient positioning: Upright in bed      OBJECTIVE  Clinical swallow evaluation completed and consisted of interview, oral motor assessment, and PO trials (puree, cracker and water via sip cup and straw).  ORAL PHASE: Natural dentition in good condition. Oral mucosa were pink, moist, and free of obvious lesions. Lingual strength and ROM were unable to be tested, pt unable to participate in oral [facility] exam. Mastication of regular solids was not successful. She spit out the cracker trial.  A/P transit and oral clearance were apparently functional.  PHARYNGEAL PHASE: Laryngeal elevation was visualized or palpated with all trials, however adequacy of hyolaryngeal elevation/excursion cannot be determined at bedside. No immediate or delayed s/sx aspiration/penetration were observed with any consistencies.    Was 3oz challenge administered: No, due to pt unable to follow instructions for task.      Treatment/Education:  Results and recommendations were relayed to: Patient and Bedside nurse  Education provided: No   Learner: Patient   Barriers to learning: Cognitive limitations barrier

## 2024-04-04 NOTE — SIGNIFICANT EVENT
[facility] Internal Medicine Daily Progress Note    Viktoriya Lyon is a 89 y.o. female on Hospital Day: 1 of admission presenting with Dehydration.    SUBJECTIVE    Overnight Events: No overnight events. Lethargic this morning likely due to 5am Ativan.     OBJECTIVE  Vitals  Vitals:    04/04/24 0534   BP: 97/60   Pulse: 95   Resp:    Temp:    SpO2: 92%       Physical Exam   Physical Exam  Vitals reviewed.   Constitutional:       General: She is sleeping.   HENT:      Head: Normocephalic.      Mouth/Throat:      Mouth: Mucous membranes are dry.   Cardiovascular:      Rate and Rhythm: Normal rate and regular rhythm.      Heart sounds: Normal heart sounds. No murmur heard.     No gallop.   Pulmonary:      Effort: Pulmonary effort is normal. No respiratory distress.      Breath sounds: Normal breath sounds.   Abdominal:      General: Abdomen is flat. There is no distension.      Palpations: Abdomen is soft.      Tenderness: There is no abdominal tenderness.   Musculoskeletal:      Right lower leg: No edema.      Left lower leg: No edema.   Skin:     General: Skin is warm and dry.   Neurological:      Mental Status: She is lethargic.           IOs  No intake or output data in the 24 hours ending 04/04/24 1306    Vent settings:         Labs:   Results from last 72 hours   Lab Units 04/04/24  0631 04/03/24  1143   SODIUM mmol/L 142 143   POTASSIUM mmol/L 2.9* 3.1*   CHLORIDE mmol/L 104 105   CO2 mmol/L 29 30   BUN mg/dL 25* 35*   CREATININE mg/dL 0.75 0.95   GLUCOSE mg/dL 103* 128*   CALCIUM mg/dL 8.6 9.2   ANION GAP mmol/L 12 11   EGFR mL/min/1.73m*2 75 57*   PHOSPHORUS mg/dL 2.1*  --       Results from last 72 hours   Lab Units 04/04/24  0631 04/03/24  1143   WBC AUTO x10*3/uL 11.6* 16.5*   HEMOGLOBIN g/dL 9.9* 10.1*   HEMATOCRIT % 31.3* 31.9*   PLATELETS AUTO x10*3/uL 316 332   NEUTROS PCT AUTO %  --  86.8   LYMPHS PCT AUTO %  --  5.9   MONOS PCT AUTO %  --  6.3   EOS PCT AUTO %  --  0.2      Lab Results   Component  "Value Date    CALCIUM 8.6 04/04/2024    PHOS 2.1 (L) 04/04/2024      No results found for: \"CRP\"   [unfilled]     Micro/ID:   No results found for the last 90 days.                   No lab exists for component: \"AGALPCRNB\"   .ID  Lab Results   Component Value Date    BLOODCULT Loaded on Instrument - Culture in progress 04/03/2024    BLOODCULT Loaded on Instrument - Culture in progress 04/03/2024       Images  CT angio chest for pulmonary embolism  Narrative: STUDY:  CT Angiogram of the Chest, CT Abdomen and Pelvis with IV Contrast;  4/3/2024 1:40 PM.  INDICATION:  Increased lethargy, abdominal pain and leukocytosis; Evaluate for  pulmonary embolism.  COMPARISON:  Chest radiograph performed the same date.  ACCESSION NUMBER(S):  [id number], [id number]  ORDERING CLINICIAN:  CHELSEA EDMOND  TECHNIQUE:  CTA of the chest was performed following rapid injection  of intravenous contrast.  Images are reviewed and processed at a  workstation according to the CT angiogram protocol with 3-D and/or MIP  post processing imaging generated.  CT of the abdomen and pelvis was  performed with intravenous contrast.  Omnipaque 350, 75 mL was  administered intravenously; positive oral contrast was given.  Automated mA/kV exposure control was utilized and patient examination  was performed in strict accordance with principles of ALARA.  FINDINGS:    CTA CHEST:  Pulmonary arteries are adequately opacified without acute or chronic  filling defects.  The thoracic aorta is normal in course and caliber  without dissection or aneurysm.  Atherosclerosis of the thoracic aorta  and coronary arteries is present.    Heart is enlarged without pericardial effusion.  Thoracic lymph nodes  are not enlarged.  There is no pleural thickening or pneumothorax.  The airways are  patent.  Small bilateral pleural effusions are present.  Mild atelectatic  changes are present.    LEFT shoulder arthroplasty appears well seated.  Severe " degenerative  changes of the RIGHT glenohumeral joint is present.  Multilevel  degenerative changes are seen throughout the thoracic spine.  ABDOMEN:     LIVER:  No hepatomegaly.  Nodular cirrhotic morphology of the liver is present  without discrete hepatic lesion.  Normal attenuation.     BILE DUCTS:  No intrahepatic or extrahepatic biliary ductal dilatation.     GALLBLADDER:  Patient is status post cholecystectomy.     STOMACH:  No abnormalities identified.     PANCREAS:  Severe pancreatic atrophy noted.     SPLEEN:  No splenomegaly or focal splenic lesion.     ADRENAL GLANDS:  Adenomatous hypertrophy of the adrenal glands is present bilaterally.      KIDNEYS AND URETERS:  Kidneys are normal in size and location.  No renal or ureteral  calculi.  Simple appearing LEFT renal cysts are present measuring up  to 4.4 cm.  No suspicious renal mass or hydronephrosis demonstrated.      PELVIS:     BLADDER:  Urinary bladder is distended.     REPRODUCTIVE ORGANS:  No abnormalities identified.     BOWEL:  Moderate amount of stool throughout the colon is present.   Constipation is not excluded.  Large amount of stool in the rectum is  present.  Developing fecal impaction is a concern.      VESSELS:  Atherosclerosis of the abdominal aorta and iliac arteries is noted.   Abdominal aorta is normal in caliber.      PERITONEUM/RETROPERITONEUM/LYMPH NODES:  No free fluid.  No pneumoperitoneum.  No lymphadenopathy.     ABDOMINAL WALL:  No abnormalities identified.  SOFT TISSUES:   Scattered phleboliths are present within the pelvis.     BONES:  No acute fracture or aggressive osseous lesion.  Multilevel  degenerative change of lumbar spine is present.  Chronic appearing  compression fracture of L2 is present.  Grade 1 anterolisthesis of L4  on L5 and L5 on S1 is present.  Levoconvex curvature of the  thoracolumbar spine is present.  Mild to moderate degenerative change  of the hips demonstrated.  Impression: 1. There is no evidence  of pulmonary embolus.  2. Small bilateral pleural effusions noted. Cardiomegaly noted.  Atherosclerosis of the thoracic aorta and coronary arteries is  present.  3. Nodular cirrhotic morphology of liver.  4. Moderate amount of stool throughout the colon and rectum. Fecal  impaction not excluded.  5. Urinary bladder distention.  Signed by Dano Mckeon II, MD  CT abdomen pelvis w IV contrast  Narrative: STUDY:  CT Angiogram of the Chest, CT Abdomen and Pelvis with IV Contrast;  4/3/2024 1:40 PM.  INDICATION:  Increased lethargy, abdominal pain and leukocytosis; Evaluate for  pulmonary embolism.  COMPARISON:  Chest radiograph performed the same date.  ACCESSION NUMBER(S):  [id number], [id number]  ORDERING CLINICIAN:  CHELSEA EDMOND  TECHNIQUE:  CTA of the chest was performed following rapid injection  of intravenous contrast.  Images are reviewed and processed at a  workstation according to the CT angiogram protocol with 3-D and/or MIP  post processing imaging generated.  CT of the abdomen and pelvis was  performed with intravenous contrast.  Omnipaque 350, 75 mL was  administered intravenously; positive oral contrast was given.  Automated mA/kV exposure control was utilized and patient examination  was performed in strict accordance with principles of ALARA.  FINDINGS:    CTA CHEST:  Pulmonary arteries are adequately opacified without acute or chronic  filling defects.  The thoracic aorta is normal in course and caliber  without dissection or aneurysm.  Atherosclerosis of the thoracic aorta  and coronary arteries is present.    Heart is enlarged without pericardial effusion.  Thoracic lymph nodes  are not enlarged.  There is no pleural thickening or pneumothorax.  The airways are  patent.  Small bilateral pleural effusions are present.  Mild atelectatic  changes are present.    LEFT shoulder arthroplasty appears well seated.  Severe degenerative  changes of the RIGHT glenohumeral joint is present.   Multilevel  degenerative changes are seen throughout the thoracic spine.  ABDOMEN:     LIVER:  No hepatomegaly.  Nodular cirrhotic morphology of the liver is present  without discrete hepatic lesion.  Normal attenuation.     BILE DUCTS:  No intrahepatic or extrahepatic biliary ductal dilatation.     GALLBLADDER:  Patient is status post cholecystectomy.     STOMACH:  No abnormalities identified.     PANCREAS:  Severe pancreatic atrophy noted.     SPLEEN:  No splenomegaly or focal splenic lesion.     ADRENAL GLANDS:  Adenomatous hypertrophy of the adrenal glands is present bilaterally.      KIDNEYS AND URETERS:  Kidneys are normal in size and location.  No renal or ureteral  calculi.  Simple appearing LEFT renal cysts are present measuring up  to 4.4 cm.  No suspicious renal mass or hydronephrosis demonstrated.      PELVIS:     BLADDER:  Urinary bladder is distended.     REPRODUCTIVE ORGANS:  No abnormalities identified.     BOWEL:  Moderate amount of stool throughout the colon is present.   Constipation is not excluded.  Large amount of stool in the rectum is  present.  Developing fecal impaction is a concern.      VESSELS:  Atherosclerosis of the abdominal aorta and iliac arteries is noted.   Abdominal aorta is normal in caliber.      PERITONEUM/RETROPERITONEUM/LYMPH NODES:  No free fluid.  No pneumoperitoneum.  No lymphadenopathy.     ABDOMINAL WALL:  No abnormalities identified.  SOFT TISSUES:   Scattered phleboliths are present within the pelvis.     BONES:  No acute fracture or aggressive osseous lesion.  Multilevel  degenerative change of lumbar spine is present.  Chronic appearing  compression fracture of L2 is present.  Grade 1 anterolisthesis of L4  on L5 and L5 on S1 is present.  Levoconvex curvature of the  thoracolumbar spine is present.  Mild to moderate degenerative change  of the hips demonstrated.  Impression: 1. There is no evidence of pulmonary embolus.  2. Small bilateral pleural effusions noted.  Cardiomegaly noted.  Atherosclerosis of the thoracic aorta and coronary arteries is  present.  3. Nodular cirrhotic morphology of liver.  4. Moderate amount of stool throughout the colon and rectum. Fecal  impaction not excluded.  5. Urinary bladder distention.  Signed by Dano Mckeon II, MD  XR chest 1 view  Narrative: STUDY:  Chest Radiograph;  4/3/2024 11:54 AM.  INDICATION:  Increased lethargy.  COMPARISON:  None Available.  ACCESSION NUMBER(S):  [id number]  ORDERING CLINICIAN:  CHELSEA EDMOND  TECHNIQUE:  Frontal chest was obtained at 11:53 hours.  FINDINGS:  CARDIOMEDIASTINAL SILHOUETTE:  Heart is enlarged with increased pulmonary vascularity.     LUNGS:  Question trace pleural effusions.     ABDOMEN:  No remarkable upper abdominal findings.     BONES:  No acute osseous changes.  Impression: Vascular congestion with trace pleural effusions.  Signed by Javed Perez MD  ECG 12 lead  Atrial fibrillation with premature ventricular or aberrantly conducted complexes  Nonspecific intraventricular conduction delay  ST & T wave abnormality, consider lateral ischemia  Abnormal ECG  No previous ECGs available      Meds  Scheduled medications  busPIRone, 20 mg, oral, BID  enoxaparin, 40 mg, subcutaneous, q24h  furosemide, 40 mg, oral, Daily  [Held by provider] glipiZIDE XL, 5 mg, oral, Daily  insulin lispro, 0-15 Units, subcutaneous, After meals & nightly  losartan, 25 mg, oral, Daily  melatonin, 3 mg, oral, Daily  [Held by provider] metFORMIN (MOD), 1,000 mg, oral, Daily with evening meal  pantoprazole, 20 mg, oral, Daily before breakfast  polyethylene glycol, 17 g, oral, Daily  potassium chloride CR, 40 mEq, oral, TID  QUEtiapine, 50 mg, oral, BID  sennosides-docusate sodium, 2 tablet, oral, BID  sertraline, 50 mg, oral, Daily  sucralfate, 1 g, oral, Before meals & nightly  traZODone, 50 mg, oral, Nightly      Continuous medications     PRN medications  PRN medications: bisacodyl, dextrose, dextrose, glucagon,  glucagon, ondansetron ODT     ASSESSMENT/PLAN  Viktoriya Lyon is a 89 y.o. female PRESENTING WITH electrolyte derangements , abdominal pain 2/2 constipation, leukocytosis and elevated troponin.    Acute Medical Issues   #Hypokalemia  #Hypomagnesemia  -s/p Klor-Con 40 mEq X3 doses  -Mag 4 g IV  -Monitor and replete as needed     #Abdominal pain likely 2/2 constipation  - CT showed mod amount of stool through colon and rectum  -MiraLAX daily  -Senna-docusate 2 tabs twice daily  -Tapwater enema once  -Dulcolax prn     #Leukocytosis likely reactive  -UA clean, CT CAP shows no signs of infection  -s/p vancomycin 1 dose in [place] [facility]  -Will monitor for now     #Elevated troponins, downtrending  - Trops 337-->304-->241  - Pt reports some chest pain with movement one day ago, not currently experiencing any symptoms like CP or SOB.  - EKG non ischemic  - continue to monitor     Chronic Medical Issues   #Dementia with psychosis: c/w quetiapine, sertraline, buspirone  #NIDDM: Hold metformin and glipizide, started ISS  #Insomnia: c/w melatonin, trazodone  #HTN: c/w losartan  #CHF: c/w furosemide  #GERD: c/w pantoprazole  #Afib [rate controlled]: not on anticoagulation due to bleeding from Crohn's  #Crohn's disease: c/w sucralfate, family will confirm her other medication     F: PO intake & IVF PRN   E: Replete as needed   N: pureed diet, thin liquids  GI ppx: Protonix 20 mg daily  DVT ppx: Lovenox Subq  Antibiotics: None  Tubes/Lines/Drains: PIV     Code Status: Full Code   Primary Emergency Contact: Rosa Wooten - [phone]  Relation: Other      Disposition: 89 y.o. female admitted for abdominal pain 2/2 constipation, cf fecal impaction, and electrolyte abdnormalities. Cont to monitor. Will try to get bowel movement.     Shree Carroll, DO  Internal Medicine, PGY-I

## 2024-04-04 NOTE — PROGRESS NOTES
Occupational Therapy                 Therapy Communication Note    Patient Name: Viktoriya Lyon  MRN: [id number]  Today's Date: 4/4/2024     Discipline: Occupational Therapy    Missed Visit Reason: Missed Visit Reason: Patient sleeping Pt cleared by RN for therapy participation but has been lethargic upon arrival to floor. Unable to keep pt alert enough for OOB activity and participation. PLOF obtained from dtr and sister sitting bedside; pt transferred to [facility] [place] [facility] [place]. Requires assist/supervision for all ADL, typically ambulates in w/c but is able to use walker with staff assist. Is Aox1 at baseline. No OT eval this date.    Missed Time: Attempt    Comment: [id number]

## 2024-04-04 NOTE — PROGRESS NOTES
Pharmacy Medication History Review    Viktoriya Lyon is a 89 y.o. female admitted for Dehydration. Pharmacy reviewed the patient's [id number] medications and allergies for accuracy.    The list below reflectives the updated PTA list. Please review each medication in order reconciliation for additional clarification and justification.  Medications Prior to Admission   Medication Sig Dispense Refill Last Dose    busPIRone (Buspar) 10 mg tablet Take 2 tablets (20 mg) by mouth 2 times a day.   4/4/2024    busPIRone (Buspar) 10 mg tablet Take 1 tablet (10 mg) by mouth once daily in the evening.   4/3/2024    furosemide (Lasix) 40 mg tablet Take 1 tablet (40 mg) by mouth once daily.   4/4/2024    glipiZIDE (Glucotrol) 5 mg tablet Take 1 tablet (5 mg) by mouth once daily. Do not crush, chew, or split.   4/4/2024    insulin lispro (HumaLOG) 100 unit/mL injection Inject 0-0.1 mL (0-10 Units) under the skin 3 times a day with meals. Take as directed per insulin instructions.       lidocaine 4 % patch Place 1 patch on the skin once daily. Remove & discard patch within 12 hours or as directed by MD. Apply to cervical spine.   4/4/2024    losartan (Cozaar) 25 mg tablet Take 1 tablet (25 mg) by mouth once daily.   4/4/2024    melatonin 3 mg tablet Take 1 tablet (3 mg) by mouth once daily at bedtime.   4/3/2024    metFORMIN (Glucophage) 1,000 mg tablet Take 1 tablet (1,000 mg) by mouth once daily in the evening. Take with meals. Do not crush, chew, or split.   4/3/2024    ondansetron ODT (Zofran-ODT) 4 mg disintegrating tablet Take 1 tablet (4 mg) by mouth every 4 hours if needed for nausea or vomiting.       pantoprazole (ProtoNix) 20 mg EC tablet Take 1 tablet (20 mg) by mouth once daily in the morning. Take before meals. Do not crush, chew, or split.   4/4/2024    QUEtiapine (SEROquel) 50 mg tablet Take 1 tablet (50 mg) by mouth 2 times a day.   4/4/2024    sertraline (Zoloft) 50 mg tablet Take 1 tablet (50 mg) by  mouth once daily.   4/4/2024    sucralfate (Carafate) 1 gram tablet Take 1 tablet (1 g) by mouth 4 times a day before meals.   4/4/2024    traZODone (Desyrel) 50 mg tablet Take 1 tablet (50 mg) by mouth once daily at bedtime.   4/3/2024        The list below reflectives the updated allergy list. Please review each documented allergy for additional clarification and justification.  Allergies  Reviewed by Kaycee Ruelas DO on 4/4/2024        Severity Reactions Comments    Bee Pollen Not Specified Swelling     Gold Au 198 Low Rash     Penicillins Low Rash             Below are additional concerns with the patient's PTA list.  Confirmed medications with facility list.     Simeon Sainz RP

## 2024-04-04 NOTE — H&P
"HPI    HPI: Viktoriya Lyon is a 89 y.o. female w/ PMH of T2DM, anxiety/depression, dementia w/ psychosis, HTN, GERD, CHF, CAD s/p stent placement, A-fib (not on AC), RCC s/p partial nephrectomy, and Crohn's disease who initially presented to [facility] from [facility] for abdominal pain and lethargy. Pt is Aox1 which is her baseline. On initial evaluation pt was found to have elevated troponin and electrolyte abnormalities and therefore transferred to [facility] for further management. Pt's family- daughter and sister are at bedside  (from out of state) and provided ancillary history as pt is poor historian and not entirely reliable. Pt states she is not currently experiencing any acute symptoms. She reports having CP \"20 hours ago\" which was located in the center of her chest and worsened with movement of her R arm. She has a history of R shoulder surgery for fracture. Pt denies any burning or pain with urination but states she has some pressure sensation in her lower abdominal area. She has not noticed any increase in urinary frequency. She also denies any active CP, SOB, cough, n/v, f/c, Family at bedside states that patient is normally more agitated and currently appears more lethargic than normal. Family states pt doesn't drink much water and usually only consumes coffee. They are from [place] and pt's medical records are within [facility]. Daughter is going to provide a list of medications from PCP to ensure we have all the accurate medical history including the medication she takes for her Crohns' disease.     ED course:     Vitals: T96.5, HR 97, RR 19, /70, 96% on room air    Labs:   -CBC -WBC 16.5, Hgb 10.1, HCT 31.9, platelets 332  -CMP -glucose 128, , K3.1, , bicarb 30, BUN 35, creatinine 0.95, GFR 57, albumin 3.2, alk phos 65, ALT 29, AST 54, T. bili 0.4, magnesium 1.06  -UA -negative  -Troponin: 337-->304-->241  -Lactate: 2.3-->1.8  -BNP: 557    Imaging:   - CXR: " Vascular congestion with trace pleural effusions.   - CTA c/a/p: 1. There is no evidence of pulmonary embolus.  2. Small bilateral pleural effusions noted. Cardiomegaly noted.  Atherosclerosis of the thoracic aorta and coronary arteries is  present.  3. Nodular cirrhotic morphology of liver.  4. Moderate amount of stool throughout the colon and rectum. Fecal  impaction not excluded.  5. Urinary bladder distention.  - EKG: A-fib, Rate 97, no obvious ST elevation or acute ischemic findings    Micro:   - Blood culture: collected    Interventions: Toradol 7.5 mg, Kcl 40 meq, vanc, Mg 2g, 500ml LR bolus    ROS: 12 points review of system is negative except as stated in the HPI above.   Past Medical History   History reviewed. No pertinent past medical history.   Surgical History   History reviewed. No pertinent surgical history.  Family History   No family history on file.  Social History     Social History     Socioeconomic History    Marital status: Single     Spouse name: Not on file    Number of children: Not on file    Years of education: Not on file    Highest education level: Not on file   Occupational History    Not on file   Tobacco Use    Smoking status: Never     Passive exposure: Never    Smokeless tobacco: Never   Substance and Sexual Activity    Alcohol use: Not on file    Drug use: Not on file    Sexual activity: Not on file   Other Topics Concern    Not on file   Social History Narrative    Not on file     Social Determinants of Health     Financial Resource Strain: Patient Unable To Answer (4/4/2024)    Overall Financial Resource Strain (CARDIA)     Difficulty of Paying Living Expenses: Patient unable to answer   Food Insecurity: Not on file   Transportation Needs: No Transportation Needs (4/4/2024)    PRAPARE - Transportation     Lack of Transportation (Medical): No     Lack of Transportation (Non-Medical): No   Physical Activity: Not on file   Stress: Not on file   Social Connections: Not on file  "  Intimate Partner Violence: Not on file   Housing Stability: Patient Unable To Answer (4/4/2024)    Housing Stability Vital Sign     Unable to Pay for Housing in the Last Year: Patient unable to answer     Number of Places Lived in the Last Year: 2     Unstable Housing in the Last Year: Patient unable to answer       Tobacco Use: Low Risk  (4/4/2024)    Patient History     Smoking Tobacco Use: Never     Smokeless Tobacco Use: Never     Passive Exposure: Never        Social History     Substance and Sexual Activity   Alcohol Use None      Allergies     Allergies   Allergen Reactions    Bee Pollen Swelling    Gold Au 198 Rash    Penicillins Rash      Meds    Scheduled medications  busPIRone, 20 mg, oral, BID  enoxaparin, 40 mg, subcutaneous, q24h  furosemide, 40 mg, oral, Daily  [Held by provider] glipiZIDE XL, 5 mg, oral, Daily  insulin lispro, 0-15 Units, subcutaneous, After meals & nightly  losartan, 25 mg, oral, Daily  magnesium sulfate, 4 g, intravenous, Once  melatonin, 3 mg, oral, Daily  [Held by provider] metFORMIN (MOD), 1,000 mg, oral, Daily with evening meal  pantoprazole, 20 mg, oral, Daily before breakfast  polyethylene glycol, 17 g, oral, Daily  potassium chloride CR, 40 mEq, oral, TID  QUEtiapine, 50 mg, oral, BID  sennosides-docusate sodium, 2 tablet, oral, BID  sertraline, 50 mg, oral, Daily  sucralfate, 1 g, oral, Before meals & nightly  traZODone, 50 mg, oral, Nightly      Continuous medications     PRN medications  PRN medications: bisacodyl, dextrose, dextrose, glucagon, glucagon   Objective     Vitals  Visit Vitals  /78   Pulse 95   Temp 36.4 °C (97.5 °F) (Tympanic)   Ht 1.676 m (5' 6\")   Wt 70 kg (154 lb 5.2 oz)   SpO2 94%   BMI 24.91 kg/m²   Smoking Status Never   BSA 1.81 m²        Physical Examination:  Gen: well appearing, in NAD  HEENT: AT/NC, no conjunctival pallor, anicteric sclera, EOMI   Neck: supple, no LAD/JVD  Chest: CTAB, no wheezing / labored respirations  CVS: RRR, no " "murmurs  Abd: soft, flat, tender to palpation in lower abdominal region  Ext: no cyanosis/clubbing, mod edema in bl LE.   Neuro: AOx1, CN 2-12 intact, motor 5/5 globally, sensation intact    I/Os  No intake or output data in the 24 hours ending 04/04/24 0219    Labs:   Results from last 72 hours   Lab Units 04/03/24  1143   SODIUM mmol/L 143   POTASSIUM mmol/L 3.1*   CHLORIDE mmol/L 105   CO2 mmol/L 30   BUN mg/dL 35*   CREATININE mg/dL 0.95   GLUCOSE mg/dL 128*   CALCIUM mg/dL 9.2   ANION GAP mmol/L 11   EGFR mL/min/1.73m*2 57*      Results from last 72 hours   Lab Units 04/03/24  1143   WBC AUTO x10*3/uL 16.5*   HEMOGLOBIN g/dL 10.1*   HEMATOCRIT % 31.9*   PLATELETS AUTO x10*3/uL 332   NEUTROS PCT AUTO % 86.8   LYMPHS PCT AUTO % 5.9   MONOS PCT AUTO % 6.3   EOS PCT AUTO % 0.2      Lab Results   Component Value Date    CALCIUM 9.2 04/03/2024      No results found for: \"CRP\"   [unfilled]     Micro/ID:   No results found for the last 90 days.                   No lab exists for component: \"AGALPCRNB\"   .ID  Lab Results   Component Value Date    BLOODCULT Loaded on Instrument - Culture in progress 04/03/2024    BLOODCULT Loaded on Instrument - Culture in progress 04/03/2024     Images    CT angio chest for pulmonary embolism  Narrative: STUDY:  CT Angiogram of the Chest, CT Abdomen and Pelvis with IV Contrast;  4/3/2024 1:40 PM.  INDICATION:  Increased lethargy, abdominal pain and leukocytosis; Evaluate for  pulmonary embolism.  COMPARISON:  Chest radiograph performed the same date.  ACCESSION NUMBER(S):  [id number], [id number]  ORDERING CLINICIAN:  CHELSEA EDMOND  TECHNIQUE:  CTA of the chest was performed following rapid injection  of intravenous contrast.  Images are reviewed and processed at a  workstation according to the CT angiogram protocol with 3-D and/or MIP  post processing imaging generated.  CT of the abdomen and pelvis was  performed with intravenous contrast.  Omnipaque 350, 75 mL " was  administered intravenously; positive oral contrast was given.  Automated mA/kV exposure control was utilized and patient examination  was performed in strict accordance with principles of ALARA.  FINDINGS:    CTA CHEST:  Pulmonary arteries are adequately opacified without acute or chronic  filling defects.  The thoracic aorta is normal in course and caliber  without dissection or aneurysm.  Atherosclerosis of the thoracic aorta  and coronary arteries is present.    Heart is enlarged without pericardial effusion.  Thoracic lymph nodes  are not enlarged.  There is no pleural thickening or pneumothorax.  The airways are  patent.  Small bilateral pleural effusions are present.  Mild atelectatic  changes are present.    LEFT shoulder arthroplasty appears well seated.  Severe degenerative  changes of the RIGHT glenohumeral joint is present.  Multilevel  degenerative changes are seen throughout the thoracic spine.  ABDOMEN:     LIVER:  No hepatomegaly.  Nodular cirrhotic morphology of the liver is present  without discrete hepatic lesion.  Normal attenuation.     BILE DUCTS:  No intrahepatic or extrahepatic biliary ductal dilatation.     GALLBLADDER:  Patient is status post cholecystectomy.     STOMACH:  No abnormalities identified.     PANCREAS:  Severe pancreatic atrophy noted.     SPLEEN:  No splenomegaly or focal splenic lesion.     ADRENAL GLANDS:  Adenomatous hypertrophy of the adrenal glands is present bilaterally.      KIDNEYS AND URETERS:  Kidneys are normal in size and location.  No renal or ureteral  calculi.  Simple appearing LEFT renal cysts are present measuring up  to 4.4 cm.  No suspicious renal mass or hydronephrosis demonstrated.      PELVIS:     BLADDER:  Urinary bladder is distended.     REPRODUCTIVE ORGANS:  No abnormalities identified.     BOWEL:  Moderate amount of stool throughout the colon is present.   Constipation is not excluded.  Large amount of stool in the rectum is  present.  Developing  fecal impaction is a concern.      VESSELS:  Atherosclerosis of the abdominal aorta and iliac arteries is noted.   Abdominal aorta is normal in caliber.      PERITONEUM/RETROPERITONEUM/LYMPH NODES:  No free fluid.  No pneumoperitoneum.  No lymphadenopathy.     ABDOMINAL WALL:  No abnormalities identified.  SOFT TISSUES:   Scattered phleboliths are present within the pelvis.     BONES:  No acute fracture or aggressive osseous lesion.  Multilevel  degenerative change of lumbar spine is present.  Chronic appearing  compression fracture of L2 is present.  Grade 1 anterolisthesis of L4  on L5 and L5 on S1 is present.  Levoconvex curvature of the  thoracolumbar spine is present.  Mild to moderate degenerative change  of the hips demonstrated.  Impression: 1. There is no evidence of pulmonary embolus.  2. Small bilateral pleural effusions noted. Cardiomegaly noted.  Atherosclerosis of the thoracic aorta and coronary arteries is  present.  3. Nodular cirrhotic morphology of liver.  4. Moderate amount of stool throughout the colon and rectum. Fecal  impaction not excluded.  5. Urinary bladder distention.  Signed by Dano Mckeon II, MD  CT abdomen pelvis w IV contrast  Narrative: STUDY:  CT Angiogram of the Chest, CT Abdomen and Pelvis with IV Contrast;  4/3/2024 1:40 PM.  INDICATION:  Increased lethargy, abdominal pain and leukocytosis; Evaluate for  pulmonary embolism.  COMPARISON:  Chest radiograph performed the same date.  ACCESSION NUMBER(S):  [id number], [id number]  ORDERING CLINICIAN:  CHELSEA EDMOND  TECHNIQUE:  CTA of the chest was performed following rapid injection  of intravenous contrast.  Images are reviewed and processed at a  workstation according to the CT angiogram protocol with 3-D and/or MIP  post processing imaging generated.  CT of the abdomen and pelvis was  performed with intravenous contrast.  Omnipaque 350, 75 mL was  administered intravenously; positive oral contrast was given.  Automated  mA/kV exposure control was utilized and patient examination  was performed in strict accordance with principles of ALARA.  FINDINGS:    CTA CHEST:  Pulmonary arteries are adequately opacified without acute or chronic  filling defects.  The thoracic aorta is normal in course and caliber  without dissection or aneurysm.  Atherosclerosis of the thoracic aorta  and coronary arteries is present.    Heart is enlarged without pericardial effusion.  Thoracic lymph nodes  are not enlarged.  There is no pleural thickening or pneumothorax.  The airways are  patent.  Small bilateral pleural effusions are present.  Mild atelectatic  changes are present.    LEFT shoulder arthroplasty appears well seated.  Severe degenerative  changes of the RIGHT glenohumeral joint is present.  Multilevel  degenerative changes are seen throughout the thoracic spine.  ABDOMEN:     LIVER:  No hepatomegaly.  Nodular cirrhotic morphology of the liver is present  without discrete hepatic lesion.  Normal attenuation.     BILE DUCTS:  No intrahepatic or extrahepatic biliary ductal dilatation.     GALLBLADDER:  Patient is status post cholecystectomy.     STOMACH:  No abnormalities identified.     PANCREAS:  Severe pancreatic atrophy noted.     SPLEEN:  No splenomegaly or focal splenic lesion.     ADRENAL GLANDS:  Adenomatous hypertrophy of the adrenal glands is present bilaterally.      KIDNEYS AND URETERS:  Kidneys are normal in size and location.  No renal or ureteral  calculi.  Simple appearing LEFT renal cysts are present measuring up  to 4.4 cm.  No suspicious renal mass or hydronephrosis demonstrated.      PELVIS:     BLADDER:  Urinary bladder is distended.     REPRODUCTIVE ORGANS:  No abnormalities identified.     BOWEL:  Moderate amount of stool throughout the colon is present.   Constipation is not excluded.  Large amount of stool in the rectum is  present.  Developing fecal impaction is a concern.      VESSELS:  Atherosclerosis of the abdominal  aorta and iliac arteries is noted.   Abdominal aorta is normal in caliber.      PERITONEUM/RETROPERITONEUM/LYMPH NODES:  No free fluid.  No pneumoperitoneum.  No lymphadenopathy.     ABDOMINAL WALL:  No abnormalities identified.  SOFT TISSUES:   Scattered phleboliths are present within the pelvis.     BONES:  No acute fracture or aggressive osseous lesion.  Multilevel  degenerative change of lumbar spine is present.  Chronic appearing  compression fracture of L2 is present.  Grade 1 anterolisthesis of L4  on L5 and L5 on S1 is present.  Levoconvex curvature of the  thoracolumbar spine is present.  Mild to moderate degenerative change  of the hips demonstrated.  Impression: 1. There is no evidence of pulmonary embolus.  2. Small bilateral pleural effusions noted. Cardiomegaly noted.  Atherosclerosis of the thoracic aorta and coronary arteries is  present.  3. Nodular cirrhotic morphology of liver.  4. Moderate amount of stool throughout the colon and rectum. Fecal  impaction not excluded.  5. Urinary bladder distention.  Signed by Dano Mckeon II, MD  XR chest 1 view  Narrative: STUDY:  Chest Radiograph;  4/3/2024 11:54 AM.  INDICATION:  Increased lethargy.  COMPARISON:  None Available.  ACCESSION NUMBER(S):  [id number]  ORDERING CLINICIAN:  CHELSEA EDMOND  TECHNIQUE:  Frontal chest was obtained at 11:53 hours.  FINDINGS:  CARDIOMEDIASTINAL SILHOUETTE:  Heart is enlarged with increased pulmonary vascularity.     LUNGS:  Question trace pleural effusions.     ABDOMEN:  No remarkable upper abdominal findings.     BONES:  No acute osseous changes.  Impression: Vascular congestion with trace pleural effusions.  Signed by Javed Perez MD  ECG 12 lead  Atrial fibrillation with premature ventricular or aberrantly conducted complexes  Nonspecific intraventricular conduction delay  ST & T wave abnormality, consider lateral ischemia  Abnormal ECG  No previous ECGs available    Assessment and Plan    Problem  list:  Principal Problem:    Dehydration      Viktoriya Lyon is a 89 y.o. female w/ PMH of T2DM, anxiety/depression, dementia w/ psychosis, HTN, GERD, CHF, CAD s/p stent placement, A-fib (not on AC), RCC s/p partial nephrectomy, and Crohn's disease who is admitted for electrolyte abnormalities, abdominal pain 2/2 constipation, leukocytosis and elevated troponin    Acute Medical Issues   #Hypokalemia  #Hypomagnesemia  -Klor-Con 40 mEq X3 doses  -Mag 4 g IV  -Monitor and replete as needed    #Abdominal pain likely 2/2 constipation  - CT showed mod amount of stool through colon and rectum  -MiraLAX daily  -Senna-docusate 2 tabs twice daily  -Tapwater enema once  -Dulcolax prn    #Leukocytosis likely reactive  -UA clean, CT CAP shows no signs of infection  -s/p vancomycin 1 dose in [facility]  -Will monitor for now    #Elevated troponins, downtrending  - Trops 337-->304-->241  - Pt reports some chest pain with movement one day ago, not currently experiencing any symptoms like CP or SOB.  - EKG non ischemic  - continue to monitor    Chronic Medical Issues   #Dementia with psychosis: c/w quetiapine, sertraline, buspirone  #NIDDM: Hold metformin and glipizide, started ISS  #Insomnia: c/w melatonin, trazodone  #HTN: c/w losartan  #CHF: c/w furosemide  #GERD: c/w pantoprazole  #Afib [rate controlled]: not on anticoagulation due to bleeding from Crohn's  #Crohn's disease: c/w sucralfate, family will confirm her other medication    F: PO intake & IVF PRN   E: Replete as needed   N: NPO pending swallow eval  GI ppx: Protonix 20 mg daily  DVT ppx: Lovenox Subq  Antibiotics: None  Tubes/Lines/Drains: PIV    Code Status: Full Code   Emergency Contact: Extended Emergency Contact Information  Primary Emergency Contact: oRsa Wooten  Home Phone: [phone]  Relation: Other     Disposition: 89 y.o.female admitted for abdominal pain 2/2 constipation, cf fecal impaction, and electrolyte abdnormalities. Estimated length of stay < 48  hrs.     Kaycee Ruelas, DO

## 2024-04-05 VITALS
HEIGHT: 66 IN | TEMPERATURE: 97.3 F | RESPIRATION RATE: 17 BRPM | BODY MASS INDEX: 25.05 KG/M2 | OXYGEN SATURATION: 95 % | DIASTOLIC BLOOD PRESSURE: 68 MMHG | SYSTOLIC BLOOD PRESSURE: 109 MMHG | HEART RATE: 91 BPM | WEIGHT: 155.87 LBS

## 2024-04-05 PROBLEM — K59.00 CONSTIPATION: Status: ACTIVE | Noted: 2024-04-05

## 2024-04-05 PROBLEM — K50.90 CROHN DISEASE (MULTI): Status: ACTIVE | Noted: 2024-04-05

## 2024-04-05 PROBLEM — E86.0 DEHYDRATION: Status: RESOLVED | Noted: 2024-04-04 | Resolved: 2024-04-05

## 2024-04-05 PROBLEM — K59.00 CONSTIPATION: Status: RESOLVED | Noted: 2024-04-05 | Resolved: 2024-04-05

## 2024-04-05 LAB
ALBUMIN SERPL BCP-MCNC: 2.8 G/DL (ref 3.4–5)
ALP SERPL-CCNC: 58 U/L (ref 33–136)
ALT SERPL W P-5'-P-CCNC: 27 U/L (ref 7–45)
ANION GAP SERPL CALC-SCNC: 12 MMOL/L (ref 10–20)
AST SERPL W P-5'-P-CCNC: 30 U/L (ref 9–39)
BILIRUB SERPL-MCNC: 0.4 MG/DL (ref 0–1.2)
BUN SERPL-MCNC: 19 MG/DL (ref 6–23)
CALCIUM SERPL-MCNC: 8.4 MG/DL (ref 8.6–10.3)
CHLORIDE SERPL-SCNC: 105 MMOL/L (ref 98–107)
CO2 SERPL-SCNC: 30 MMOL/L (ref 21–32)
CREAT SERPL-MCNC: 0.71 MG/DL (ref 0.5–1.05)
EGFRCR SERPLBLD CKD-EPI 2021: 80 ML/MIN/1.73M*2
ERYTHROCYTE [DISTWIDTH] IN BLOOD BY AUTOMATED COUNT: 14.8 % (ref 11.5–14.5)
GLUCOSE BLD MANUAL STRIP-MCNC: 135 MG/DL (ref 74–99)
GLUCOSE BLD MANUAL STRIP-MCNC: 147 MG/DL (ref 74–99)
GLUCOSE BLD MANUAL STRIP-MCNC: 154 MG/DL (ref 74–99)
GLUCOSE SERPL-MCNC: 133 MG/DL (ref 74–99)
HCT VFR BLD AUTO: 32.5 % (ref 36–46)
HGB BLD-MCNC: 10.1 G/DL (ref 12–16)
MAGNESIUM SERPL-MCNC: 1.82 MG/DL (ref 1.6–2.4)
MCH RBC QN AUTO: 28.4 PG (ref 26–34)
MCHC RBC AUTO-ENTMCNC: 31.1 G/DL (ref 32–36)
MCV RBC AUTO: 91 FL (ref 80–100)
NRBC BLD-RTO: 0 /100 WBCS (ref 0–0)
PHOSPHATE SERPL-MCNC: 4.1 MG/DL (ref 2.5–4.9)
PLATELET # BLD AUTO: 320 X10*3/UL (ref 150–450)
POTASSIUM SERPL-SCNC: 3.7 MMOL/L (ref 3.5–5.3)
PROT SERPL-MCNC: 5.5 G/DL (ref 6.4–8.2)
RBC # BLD AUTO: 3.56 X10*6/UL (ref 4–5.2)
SODIUM SERPL-SCNC: 143 MMOL/L (ref 136–145)
WBC # BLD AUTO: 11.5 X10*3/UL (ref 4.4–11.3)

## 2024-04-05 PROCEDURE — 99239 HOSP IP/OBS DSCHRG MGMT >30: CPT | Performed by: STUDENT IN AN ORGANIZED HEALTH CARE EDUCATION/TRAINING PROGRAM

## 2024-04-05 PROCEDURE — 80053 COMPREHEN METABOLIC PANEL: CPT | Performed by: STUDENT IN AN ORGANIZED HEALTH CARE EDUCATION/TRAINING PROGRAM

## 2024-04-05 PROCEDURE — 82947 ASSAY GLUCOSE BLOOD QUANT: CPT | Mod: 59

## 2024-04-05 PROCEDURE — 84100 ASSAY OF PHOSPHORUS: CPT | Performed by: STUDENT IN AN ORGANIZED HEALTH CARE EDUCATION/TRAINING PROGRAM

## 2024-04-05 PROCEDURE — 2500000004 HC RX 250 GENERAL PHARMACY W/ HCPCS (ALT 636 FOR OP/ED)

## 2024-04-05 PROCEDURE — 85027 COMPLETE CBC AUTOMATED: CPT | Performed by: STUDENT IN AN ORGANIZED HEALTH CARE EDUCATION/TRAINING PROGRAM

## 2024-04-05 PROCEDURE — 2500000002 HC RX 250 W HCPCS SELF ADMINISTERED DRUGS (ALT 637 FOR MEDICARE OP, ALT 636 FOR OP/ED): Mod: MUE

## 2024-04-05 PROCEDURE — 36415 COLL VENOUS BLD VENIPUNCTURE: CPT | Performed by: STUDENT IN AN ORGANIZED HEALTH CARE EDUCATION/TRAINING PROGRAM

## 2024-04-05 PROCEDURE — 97165 OT EVAL LOW COMPLEX 30 MIN: CPT | Mod: GO

## 2024-04-05 PROCEDURE — 2500000001 HC RX 250 WO HCPCS SELF ADMINISTERED DRUGS (ALT 637 FOR MEDICARE OP)

## 2024-04-05 PROCEDURE — 92526 ORAL FUNCTION THERAPY: CPT | Mod: GN

## 2024-04-05 PROCEDURE — 96372 THER/PROPH/DIAG INJ SC/IM: CPT

## 2024-04-05 PROCEDURE — 97161 PT EVAL LOW COMPLEX 20 MIN: CPT | Mod: GP

## 2024-04-05 PROCEDURE — 83735 ASSAY OF MAGNESIUM: CPT | Performed by: STUDENT IN AN ORGANIZED HEALTH CARE EDUCATION/TRAINING PROGRAM

## 2024-04-05 PROCEDURE — G0378 HOSPITAL OBSERVATION PER HR: HCPCS

## 2024-04-05 RX ORDER — POLYETHYLENE GLYCOL 3350 17 G/17G
17 POWDER, FOR SOLUTION ORAL DAILY
Status: CANCELLED
Start: 2024-04-06

## 2024-04-05 RX ORDER — TALC
3 POWDER (GRAM) TOPICAL NIGHTLY
Status: DISCONTINUED | OUTPATIENT
Start: 2024-04-05 | End: 2024-04-05 | Stop reason: HOSPADM

## 2024-04-05 RX ORDER — AMOXICILLIN 250 MG
2 CAPSULE ORAL 2 TIMES DAILY
Qty: 120 TABLET | Refills: 0
Start: 2024-04-05 | End: 2024-05-05

## 2024-04-05 RX ORDER — MESALAMINE 1.2 G/1
1.2 TABLET, DELAYED RELEASE ORAL
Status: DISCONTINUED | OUTPATIENT
Start: 2024-04-05 | End: 2024-04-05 | Stop reason: HOSPADM

## 2024-04-05 RX ORDER — BISACODYL 5 MG
10 TABLET, DELAYED RELEASE (ENTERIC COATED) ORAL DAILY PRN
Qty: 2 TABLET | Refills: 0 | Status: ON HOLD
Start: 2024-04-05 | End: 2024-04-13

## 2024-04-05 RX ORDER — AMOXICILLIN 250 MG
2 CAPSULE ORAL 2 TIMES DAILY
Qty: 120 TABLET | Refills: 0 | Status: CANCELLED
Start: 2024-04-05 | End: 2024-05-05

## 2024-04-05 RX ORDER — POLYETHYLENE GLYCOL 3350 17 G/17G
17 POWDER, FOR SOLUTION ORAL DAILY
Start: 2024-04-06 | End: 2024-05-06

## 2024-04-05 RX ORDER — BISACODYL 5 MG
10 TABLET, DELAYED RELEASE (ENTERIC COATED) ORAL DAILY PRN
Qty: 30 TABLET | Refills: 0 | Status: CANCELLED
Start: 2024-04-05

## 2024-04-05 RX ORDER — MESALAMINE 400 MG/1
400 CAPSULE, DELAYED RELEASE ORAL 2 TIMES DAILY
Start: 2024-04-05 | End: 2024-05-05

## 2024-04-05 RX ADMIN — POLYETHYLENE GLYCOL 3350 17 G: 17 POWDER, FOR SOLUTION ORAL at 10:45

## 2024-04-05 RX ADMIN — PANTOPRAZOLE SODIUM 20 MG: 20 TABLET, DELAYED RELEASE ORAL at 10:45

## 2024-04-05 RX ADMIN — SENNOSIDES AND DOCUSATE SODIUM 2 TABLET: 8.6; 5 TABLET ORAL at 10:45

## 2024-04-05 RX ADMIN — QUETIAPINE FUMARATE 50 MG: 25 TABLET ORAL at 10:45

## 2024-04-05 RX ADMIN — SUCRALFATE 1 G: 1 TABLET ORAL at 10:45

## 2024-04-05 RX ADMIN — SERTRALINE HYDROCHLORIDE 50 MG: 50 TABLET ORAL at 10:45

## 2024-04-05 RX ADMIN — BUSPIRONE HYDROCHLORIDE 20 MG: 10 TABLET ORAL at 10:44

## 2024-04-05 RX ADMIN — ENOXAPARIN SODIUM 40 MG: 40 INJECTION SUBCUTANEOUS at 10:45

## 2024-04-05 ASSESSMENT — PAIN SCALES - PAIN ASSESSMENT IN ADVANCED DEMENTIA (PAINAD)
CONSOLABILITY: DISTRACTED OR REASSURED BY VOICE/TOUCH
CONSOLABILITY: DISTRACTED OR REASSURED BY VOICE/TOUCH
BREATHING: NORMAL
BODYLANGUAGE: OCCASIONAL MOAN/GROAN, LOW SPEECH, NEGATIVE/DISAPPROVING QUALITY
NEGVOCALIZATION: OCCASIONAL MOAN/GROAN, LOW SPEECH, NEGATIVE/DISAPPROVING QUALITY
BREATHING: NORMAL
BODYLANGUAGE: RELAXED
TOTALSCORE: REPOSITIONED
TOTALSCORE: 3
TOTALSCORE: 3
TOTALSCORE: REPOSITIONED
FACIALEXPRESSION: SAD, FRIGHTENED, FROWN
FACIALEXPRESSION: SAD, FRIGHTENED, FROWN
BODYLANGUAGE: RELAXED

## 2024-04-05 ASSESSMENT — PAIN - FUNCTIONAL ASSESSMENT
PAIN_FUNCTIONAL_ASSESSMENT: PAINAD (PAIN ASSESSMENT IN ADVANCED DEMENTIA SCALE)
PAIN_FUNCTIONAL_ASSESSMENT: PAINAD (PAIN ASSESSMENT IN ADVANCED DEMENTIA SCALE)
PAIN_FUNCTIONAL_ASSESSMENT: 0-10

## 2024-04-05 ASSESSMENT — COGNITIVE AND FUNCTIONAL STATUS - GENERAL
DAILY ACTIVITIY SCORE: 6
PERSONAL GROOMING: A LOT
CLIMB 3 TO 5 STEPS WITH RAILING: TOTAL
TOILETING: TOTAL
DAILY ACTIVITIY SCORE: 12
HELP NEEDED FOR BATHING: A LOT
STANDING UP FROM CHAIR USING ARMS: TOTAL
EATING MEALS: TOTAL
DRESSING REGULAR UPPER BODY CLOTHING: A LOT
CLIMB 3 TO 5 STEPS WITH RAILING: TOTAL
HELP NEEDED FOR BATHING: TOTAL
MOVING FROM LYING ON BACK TO SITTING ON SIDE OF FLAT BED WITH BEDRAILS: TOTAL
EATING MEALS: A LOT
MOVING TO AND FROM BED TO CHAIR: A LOT
TURNING FROM BACK TO SIDE WHILE IN FLAT BAD: A LOT
DRESSING REGULAR UPPER BODY CLOTHING: TOTAL
PERSONAL GROOMING: TOTAL
STANDING UP FROM CHAIR USING ARMS: A LOT
TURNING FROM BACK TO SIDE WHILE IN FLAT BAD: TOTAL
WALKING IN HOSPITAL ROOM: TOTAL
WALKING IN HOSPITAL ROOM: A LOT
DRESSING REGULAR LOWER BODY CLOTHING: TOTAL
MOBILITY SCORE: 6
TOILETING: A LOT
MOVING FROM LYING ON BACK TO SITTING ON SIDE OF FLAT BED WITH BEDRAILS: A LOT
MOBILITY SCORE: 11
DRESSING REGULAR LOWER BODY CLOTHING: A LOT
MOVING TO AND FROM BED TO CHAIR: TOTAL

## 2024-04-05 ASSESSMENT — ACTIVITIES OF DAILY LIVING (ADL): BATHING_ASSISTANCE: TOTAL

## 2024-04-05 NOTE — PROGRESS NOTES
Occupational Therapy    Evaluation    Patient Name: Viktoriya Lyon  MRN: [id number]  Today's Date: 4/5/2024  Time Calculation  Start Time: 1103  Stop Time: 1123  Time Calculation (min): 20 min    Assessment  IP OT Assessment  OT Assessment: Pt at baseline with ADL  performance and functional mobilty. Pt with poor cognition and unable to participate meaningfully in skilled therapy. No acute OT goals identified  Prognosis: Fair  Barriers to Discharge: None  Evaluation/Treatment Tolerance: Treatment limited secondary to medical complications (Comment) (cognition)  Medical Staff Made Aware: Yes  End of Session Communication: Bedside nurse  End of Session Patient Position: Bed, 3 rail up, Alarm on  Plan:  No Skilled OT: No acute OT goals identified  OT Frequency: OT eval only  OT Discharge Recommendations: No further acute OT  OT Recommended Transfer Status: Dependent  OT - OK to Discharge: Yes (per OT POC)    Subjective   Current Problem:  No diagnosis found.  General:  General  Reason for Referral: 89 yo female referred to OT for impaired ADL, impaired safety  Referred By: LARISA Balderas  Past Medical History Relevant to Rehab: PMH: DM II, anxiety/depression, dementia, HTN, Afib, CHF, CAD s/p stent  Missed Visit: Yes  Missed Visit Reason: Patient sleeping  Family/Caregiver Present: Yes  Caregiver Feedback: sister and dtr present and engaged in session  Co-Treatment: PT  Co-Treatment Reason: to maximize pt safety and outcomes  Prior to Session Communication: Bedside nurse  Patient Position Received: Bed, 3 rail up  General Comment: pt supine in bed, cleared for session per RN. family at bedisde, pt confused, calling out, and appears restless when awake. Pt ableto be directed to sit EOB, though became resistive with attempts to initiate standing. Pt not directable to automatic or functional tasks as assessed. Positioned for comfort at end of session  Precautions:  Medical Precautions: Fall precautions    Pain:  Pain  Assessment  Pain Assessment: PAINAD (Pain Assessment in Advanced Dementia Scale)    Objective   Cognition:  Overall Cognitive Status: Impaired at baseline  Orientation Level: Disoriented X4     Home Living:  Type of Home:  [facility], [facility] at [facility] x3 weeks[facility]   Prior Function:  Level of [place]: Needs assistance with ADLs, Needs assistance with functional transfers  Prior Function Comments: per dtr, pt was able to ambulate in apt with WW and assist, otherwise assist to transfer to W/C. At [facility], pt required assist for transfer to W/C, dependent for ADLs     ADL:  Eating Assistance: Maximal  Grooming Assistance: Total  Bathing Assistance: Total  UE Dressing Assistance: Total  LE Dressing Assistance: Total  Toileting Assistance with Device: Total  Functional Assistance: Total  ADL Comments: Performanc anticipated d/t cognition    Bed Mobility/Transfers: Bed Mobility  Bed Mobility: Yes  Bed Mobility 1  Bed Mobility 1: Supine to sitting, Sitting to supine  Level of Assistance 1: Dependent    Transfers  Transfer:  (attempt to transfers sit<>stand x2 trials, pt resistive and unable to achive full stand despite assist.)    Strength:  Strength Comments: unable to formally assess d/t cognition    Outcome Measures: [facility] Daily Activity  Putting on and taking off regular lower body clothing: Total  Bathing (including washing, rinsing, drying): Total  Putting on and taking off regular upper body clothing: Total  Toileting, which includes using toilet, bedpan or urinal: Total  Taking care of personal grooming such as brushing teeth: Total  Eating Meals: Total  Daily Activity - Total Score: 6    Education Documentation  No documentation found.  Education Comments  No comments found.

## 2024-04-05 NOTE — DISCHARGE SUMMARY
Discharge Diagnosis  Hypokalemia  Hypomagnesia  Abdominal pain secondary to constipation  Leukocytosis likely reactive    Issues Requiring Follow-Up  Titrate bowel regimen as needed for constipation    Discharge Meds     Your medication list        START taking these medications        Instructions Last Dose Given Next Dose Due   bisacodyl 5 mg EC tablet  Commonly known as: Dulcolax      Take 2 tablets (10 mg) by mouth once daily as needed for constipation for up to 2 days. Do not crush, chew, or split.       mesalamine 400 mg DR capsule  Commonly known as: Delzicol      Take 1 capsule (400 mg) by mouth 2 times a day.       polyethylene glycol 17 gram packet  Commonly known as: Glycolax, Miralax  Start taking on: April 6, 2024      Take 17 g by mouth once daily. Do not start before April 6, 2024.       sennosides-docusate sodium 8.6-50 mg tablet  Commonly known as: Quyen-Colace      Take 2 tablets by mouth 2 times a day.              CONTINUE taking these medications        Instructions Last Dose Given Next Dose Due   busPIRone 10 mg tablet  Commonly known as: Buspar           busPIRone 10 mg tablet  Commonly known as: Buspar           furosemide 40 mg tablet  Commonly known as: Lasix           glipiZIDE 5 mg tablet  Commonly known as: Glucotrol           insulin lispro 100 unit/mL injection  Commonly known as: HumaLOG           lidocaine 4 % patch           losartan 25 mg tablet  Commonly known as: Cozaar           melatonin 3 mg tablet           metFORMIN 1,000 mg tablet  Commonly known as: Glucophage           ondansetron ODT 4 mg disintegrating tablet  Commonly known as: Zofran-ODT           pantoprazole 20 mg EC tablet  Commonly known as: ProtoNix           QUEtiapine 50 mg tablet  Commonly known as: SEROquel           sertraline 50 mg tablet  Commonly known as: Zoloft           sucralfate 1 gram tablet  Commonly known as: Carafate           traZODone 50 mg tablet  Commonly known as: Desyrel                    "  Where to Get Your Medications        Information about where to get these medications is not yet available    Ask your nurse or doctor about these medications  bisacodyl 5 mg EC tablet  mesalamine 400 mg DR capsule  polyethylene glycol 17 gram packet  sennosides-docusate sodium 8.6-50 mg tablet         Test Results Pending At Discharge  Pending Labs       No current pending labs.            Hospital Course  Viktoriya Lyon is a 89 y.o. female w/ PMH of T2DM, anxiety/depression, dementia w/ psychosis, HTN, GERD, CHF, CAD s/p stent placement, A-fib (not on AC), RCC s/p partial nephrectomy, and Crohn's disease who initially presented to [place] from [facility] for abdominal pain and lethargy. Pt is Aox1 which is her baseline. On initial evaluation pt was found to have elevated troponin and electrolyte abnormalities and therefore transferred to [facility] for further management. Pt's family- daughter and sister are at bedside  (from out of state) and provided ancillary history as pt is poor historian and not entirely reliable. Pt states she is not currently experiencing any acute symptoms. She reports having CP \"20 hours ago\" which was located in the center of her chest and worsened with movement of her R arm. She has a history of R shoulder surgery for fracture. Pt denies any burning or pain with urination but states she has some pressure sensation in her lower abdominal area. She has not noticed any increase in urinary frequency. She also denies any active CP, SOB, cough, n/v, f/c, Family at bedside states that patient is normally more agitated and currently appears more lethargic than normal. Family states pt doesn't drink much water and usually only consumes coffee.     ED COURSE:   Vitals: T96.5, HR 97, RR 19, /70, 96% on room air     Labs:   -CBC -WBC 16.5, Hgb 10.1, HCT 31.9, platelets 332  -CMP -glucose 128, , K3.1, , bicarb 30, BUN 35, creatinine 0.95, GFR 57, albumin 3.2, alk phos 65, " ALT 29, AST 54, T. bili 0.4, magnesium 1.06  -UA -negative  -Troponin: 337-->304-->241  -Lactate: 2.3-->1.8  -BNP: 557     Imaging:   - CXR: Vascular congestion with trace pleural effusions.   - CTA c/a/p: 1. There is no evidence of pulmonary embolus.  2. Small bilateral pleural effusions noted. Cardiomegaly noted.  Atherosclerosis of the thoracic aorta and coronary arteries is  present.  3. Nodular cirrhotic morphology of liver.  4. Moderate amount of stool throughout the colon and rectum. Fecal  impaction not excluded.  5. Urinary bladder distention.  - EKG: A-fib, Rate 97, no obvious ST elevation or acute ischemic findings     Interventions: Toradol 7.5 mg, Kcl 40 meq, vanc, Mg 2g, 500ml LR bolus    FLOOR COURSE:   Magnesium and potassium were repleted aggressively. Patient received a enema and had a bowel movement on 4/5. Leukocytosis improved and patient was cleared for discharge.     Pertinent Physical Exam At Time of Discharge  Physical Exam  Vitals reviewed.   HENT:      Head: Normocephalic.   Eyes:      Pupils: Pupils are equal, round, and reactive to light.   Cardiovascular:      Rate and Rhythm: Normal rate and regular rhythm.      Heart sounds: Normal heart sounds. No murmur heard.     No gallop.   Pulmonary:      Effort: Pulmonary effort is normal. No respiratory distress.      Breath sounds: Normal breath sounds.   Abdominal:      General: Abdomen is flat. There is no distension.      Palpations: Abdomen is soft.      Tenderness: There is no abdominal tenderness.   Musculoskeletal:      Right lower leg: No edema.      Left lower leg: No edema.   Skin:     General: Skin is warm and dry.   Neurological:      Mental Status: She is lethargic.   Psychiatric:         Mood and Affect: Mood normal.         Behavior: Behavior normal.         Outpatient Follow-Up  No future appointments.      Shree Carroll DO

## 2024-04-05 NOTE — PROGRESS NOTES
Speech-Language Pathology    SLP Adult Inpatient  Speech-Language Pathology Treatment     Patient Name: Viktoriya Lyon  MRN: [id number]  Today's Date: 4/5/2024  Time Calculation  Start Time: 1300  Stop Time: 1315  Time Calculation (min): 15 min         SLP Assessment:  Prognosis: Fair       Plan:  SLP Discharge Recommendations: Continue skilled SLP services at the next level of care for ongoing diet/meal analysis and to ensure safest diet.       Goals  Anticipated time frame for goal attainment: 2 weeks:     Goal established 4/4/24: Pt will consume prescribed diet (current diet is pureed with thin liquids) without overt s/sx aspiration/penetration in 95% of observed trials.              Status: Goal initiated this date. 4/5 goal adjusted due to s/s aspiration with thin liquids.                            Discharge this goal-s/s aspiration.   2.   Goal established 4/4/24:  Patient will participate in ongoing evaluation of swallowing functioning to ensure he is on the safest and least restrictive diet level.                Status: Goal initiated this date. 4/5 ongoing.   3.   New goal established 4/5/24: Pt will consume prescribed diet (puree and mildly thick liquids) without overt s/s aspiration/penetration in 95% of observed trials.          Subjective     Per pt's nurse Rema and pt's family, pt has been coughing on thin liquids consistently and nurse is in process of getting a hold of the dr to downgrade pt's diet.     Per nurse, pt to discharge today to SNF.     Most Recent Visit:  SLP Received On: 04/05/24    General Visit Information:   Caregiver Feedback: sister present  Prior to Session Communication: Bedside nurse      Pain Assessment:   Pain Assessment: 0-10  Pain Type: Acute pain  Pain Location: Abdomen      Objective     Pt seen for dysphagia follow up this date. Evaluation yesterday 4/4 recommended puree diet with thin liquids; however pt's family at bedside and nurse are observing overt s/s  aspiration with thin liquids therefor nurse in process of downgrading diet.     Pt seen bedside; very limited arousability this date. Minimally arousable despite verbal and tactile cues from SLP and pt's sister at bedside. Pt did take few tsp sips of mildly thick water; good tolerance for first few then some throat clearing observed. Do suspect that this is more related to reduced MYAH. PO trials stopped at this time due to poor MYAH.  Do recommend pt to downgrade to mildly thick liquids; continue with puree textures at this time. Pt to eat/drink only when fully awake and alert.     Inpatient:  Education Documentation    Reviewed recommendations with nurse and pt's sister (pt's daughter not in room presently).         Consultations/Referrals/Coordination of Services:     Recommend continued SLP follow up at discharge to ensure pt is on appropriate diet consistency and ensure tolerance. Recommend puree diet and mildly thick liquids at this time.

## 2024-04-05 NOTE — HOSPITAL COURSE
"Viktoriya Lyon is a 89 y.o. female w/ PMH of T2DM, anxiety/depression, dementia w/ psychosis, HTN, GERD, CHF, CAD s/p stent placement, A-fib (not on AC), RCC s/p partial nephrectomy, and Crohn's disease who initially presented to [place] from [facility] for abdominal pain and lethargy. Pt is Aox1 which is her baseline. On initial evaluation pt was found to have elevated troponin and electrolyte abnormalities and therefore transferred to [facility] for further management. Pt's family- daughter and sister are at bedside  (from out of state) and provided ancillary history as pt is poor historian and not entirely reliable. Pt states she is not currently experiencing any acute symptoms. She reports having CP \"20 hours ago\" which was located in the center of her chest and worsened with movement of her R arm. She has a history of R shoulder surgery for fracture. Pt denies any burning or pain with urination but states she has some pressure sensation in her lower abdominal area. She has not noticed any increase in urinary frequency. She also denies any active CP, SOB, cough, n/v, f/c, Family at bedside states that patient is normally more agitated and currently appears more lethargic than normal. Family states pt doesn't drink much water and usually only consumes coffee.     ED COURSE:   Vitals: T96.5, HR 97, RR 19, /70, 96% on room air     Labs:   -CBC -WBC 16.5, Hgb 10.1, HCT 31.9, platelets 332  -CMP -glucose 128, , K3.1, , bicarb 30, BUN 35, creatinine 0.95, GFR 57, albumin 3.2, alk phos 65, ALT 29, AST 54, T. bili 0.4, magnesium 1.06  -UA -negative  -Troponin: 337-->304-->241  -Lactate: 2.3-->1.8  -BNP: 557     Imaging:   - CXR: Vascular congestion with trace pleural effusions.   - CTA c/a/p: 1. There is no evidence of pulmonary embolus.  2. Small bilateral pleural effusions noted. Cardiomegaly noted.  Atherosclerosis of the thoracic aorta and coronary arteries is  present.  3. Nodular cirrhotic " morphology of liver.  4. Moderate amount of stool throughout the colon and rectum. Fecal  impaction not excluded.  5. Urinary bladder distention.  - EKG: A-fib, Rate 97, no obvious ST elevation or acute ischemic findings     Interventions: Toradol 7.5 mg, Kcl 40 meq, vanc, Mg 2g, 500ml LR bolus    FLOOR COURSE:   Magnesium and potassium were repleted aggressively. Patient received a enema and had a bowel movement on 4/5. Leukocytosis improved and patient was cleared for discharge.

## 2024-04-05 NOTE — DISCHARGE INSTRUCTIONS
The following recommendations are made for you at time of hospital discharge:  -Diet: puree and mildly thick liquids   -Bowel regimen prescribed. Can adjust as needed.     Please take your previous home medications as instructed prior to hospitalization.     Please follow-up with your primary care provider within 5-7 days from time of discharge. Please call your PCP's office to schedule an appointment. You may need to bring photo ID and insurance card to your appointment.     If you experience any worsening symptoms or any new acute concerns arise, please contact your primary care provider to discuss and possibly arrange an appointment. You need to call your doctor or return to the closest ED if you develop any new or concerning symptoms such as fevers, chills, chest pain, shortness of breath, diarrhea, and so forth.  If you cannot get in touch with your primary care provider or severe symptoms are present, please return to nearest emergency room/urgent care for evaluation and treatment.

## 2024-04-05 NOTE — PROGRESS NOTES
Physical Therapy    Physical Therapy Evaluation    Patient Name: Viktoriya Lyon  MRN: [id number]  Today's Date: 4/5/2024   Time Calculation  Start Time: 1102  Stop Time: 1123  Time Calculation (min): 21 min    Assessment/Plan   PT Assessment  PT Assessment Results: Decreased mobility  Rehab Prognosis: Poor  Medical Staff Made Aware: Yes  End of Session Communication: Bedside nurse  Assessment Comment: pt demonstrates poor cogntion with inability to follow instructions for purposeful tasks as assessed. Recommend total assist to complete all OOB transfers and 24/7 supervision and assist d/t cognition. No further skilled PT needs indicated at this time.            Subjective   General Visit Information:  General  Reason for Referral: 89 YOF admitted with AMS  Referred By: LARISA Balderas  Past Medical History Relevant to Rehab: PMH: DM II, anxiety/depression, dementia, HTN, Afib, CHF, CAD s/p stent  Family/Caregiver Present: Yes  Caregiver Feedback: sister and dtr present and engaged in session  Co-Treatment: OT  Co-Treatment Reason: necessary to maximize pt safety and participation  Prior to Session Communication: Bedside nurse  Patient Position Received: Bed, 3 rail up  General Comment: pt supine in bed, cleared for session per RN. family at bedisde, pt confused, calling out, and appears restless when awake. Pt ableto be directed to sit EOB, though became resistive with attempts to initiate standing. Pt not directable to automatic or functional tasks as assessed. Positioned for comfort at end of session  Home Living:  Home Living  Type of Home:  ([facility] since 10/2023, recently at [facility] x3 weeks.)  Prior Level of Function:  Prior Function Per Pt/Caregiver Report  Level of Gerv[place]: Needs assistance with ADLs, Needs assistance with functional transfers  Prior Function Comments: per dtr, pt was able to ambulate in apt with WW and assist, otherwise assist to transfer to W/C. At [facility], pt required  assist for transfer to W/C, dependent for ADLs  Precautions:  Precautions  Medical Precautions: Fall precautions  Vital Signs:       Objective   Pain:  Pain Assessment  Pain Assessment: PAINAD (Pain Assessment in Advanced Dementia Scale)  Cognition:  Cognition  Overall Cognitive Status: Impaired at baseline    General Assessments:                            Strength  Strength Comments: unable to formally assess d/t cognition  Dynamic Sitting Balance  Dynamic Sitting-Balance Support:  (B to no UE support)  Dynamic Sitting-Balance:  (UE ROM, trunk mobility)  Dynamic Sitting-Comments: CGA  Functional Assessments:  Bed Mobility  Bed Mobility: Yes  Bed Mobility 1  Bed Mobility 1: Supine to sitting, Sitting to supine  Level of Assistance 1: Dependent    Transfers  Transfer:  (attempt to transfers sit<>stand x2 trials, pt resistive and unable to achive full stand despite assist.)    Outcome Measures:  [facility] Basic Mobility  Turning from your back to your side while in a flat bed without using bedrails: Total  Moving from lying on your back to sitting on the side of a flat bed without using bedrails: Total  Moving to and from bed to chair (including a wheelchair): Total  Standing up from a chair using your arms (e.g. wheelchair or bedside chair): Total  To walk in hospital room: Total  Climbing 3-5 steps with railing: Total  Basic Mobility - Total Score: 6    Encounter Problems       Encounter Problems (Active)       Pain - Adult              Education Documentation  Precautions, taught by Kaitlyn Pulido PT at 4/5/2024 12:42 PM.  Learner: Family  Readiness: Acceptance  Method: Explanation  Response: Verbalizes Understanding    Mobility Training, taught by aKitlyn Pulido PT at 4/5/2024 12:42 PM.  Learner: Family  Readiness: Acceptance  Method: Explanation  Response: Verbalizes Understanding    Education Comments  No comments found.

## 2024-04-06 LAB
ATRIAL RATE: 97 BPM
Q ONSET: 216 MS
QRS COUNT: 16 BEATS
QRS DURATION: 124 MS
QT INTERVAL: 366 MS
QTC CALCULATION(BAZETT): 464 MS
QTC FREDERICIA: 429 MS
R AXIS: -27 DEGREES
T AXIS: 142 DEGREES
T OFFSET: 399 MS
VENTRICULAR RATE: 97 BPM

## 2024-04-07 LAB
BACTERIA BLD CULT: NORMAL
BACTERIA BLD CULT: NORMAL

## 2024-04-12 ENCOUNTER — APPOINTMENT (OUTPATIENT)
Dept: CARDIOLOGY | Facility: HOSPITAL | Age: 89
End: 2024-04-12
Payer: MEDICARE

## 2024-04-12 ENCOUNTER — HOSPITAL ENCOUNTER (OUTPATIENT)
Facility: HOSPITAL | Age: 89
Setting detail: OBSERVATION
LOS: 1 days | Discharge: NURSING, REHAB OR LONG-TERM CARE | DRG: 176 | End: 2024-04-13
Attending: INTERNAL MEDICINE | Admitting: INTERNAL MEDICINE
Payer: MEDICARE

## 2024-04-12 ENCOUNTER — APPOINTMENT (OUTPATIENT)
Dept: RADIOLOGY | Facility: HOSPITAL | Age: 89
End: 2024-04-12
Payer: MEDICARE

## 2024-04-12 ENCOUNTER — HOSPITAL ENCOUNTER (EMERGENCY)
Facility: HOSPITAL | Age: 89
Discharge: OTHER INSTITUTION | End: 2024-04-12
Attending: EMERGENCY MEDICINE
Payer: MEDICARE

## 2024-04-12 VITALS
SYSTOLIC BLOOD PRESSURE: 136 MMHG | HEART RATE: 111 BPM | RESPIRATION RATE: 28 BRPM | DIASTOLIC BLOOD PRESSURE: 92 MMHG | HEIGHT: 62 IN | OXYGEN SATURATION: 97 % | BODY MASS INDEX: 27.59 KG/M2 | WEIGHT: 149.91 LBS | TEMPERATURE: 97.5 F

## 2024-04-12 DIAGNOSIS — Z86.711 PERSONAL HISTORY OF PE (PULMONARY EMBOLISM): Primary | ICD-10-CM

## 2024-04-12 DIAGNOSIS — I26.93 SINGLE SUBSEGMENTAL PULMONARY EMBOLISM WITHOUT ACUTE COR PULMONALE (MULTI): Primary | ICD-10-CM

## 2024-04-12 DIAGNOSIS — K59.00 CONSTIPATION, UNSPECIFIED CONSTIPATION TYPE: ICD-10-CM

## 2024-04-12 LAB
ALBUMIN SERPL BCP-MCNC: 3.1 G/DL (ref 3.4–5)
ALP SERPL-CCNC: 75 U/L (ref 33–136)
ALT SERPL W P-5'-P-CCNC: 17 U/L (ref 7–45)
ANION GAP BLDA CALCULATED.4IONS-SCNC: 14 MMO/L (ref 10–25)
ANION GAP SERPL CALC-SCNC: 12 MMOL/L (ref 10–20)
APPEARANCE UR: CLEAR
APTT PPP: 83.9 SECONDS (ref 22–32.5)
AST SERPL W P-5'-P-CCNC: 15 U/L (ref 9–39)
BASE EXCESS BLDA CALC-SCNC: 0.7 MMOL/L (ref -2–3)
BASOPHILS # BLD AUTO: 0.05 X10*3/UL (ref 0–0.1)
BASOPHILS NFR BLD AUTO: 0.3 %
BILIRUB SERPL-MCNC: 0.5 MG/DL (ref 0–1.2)
BILIRUB UR STRIP.AUTO-MCNC: NEGATIVE MG/DL
BNP SERPL-MCNC: 494 PG/ML (ref 0–99)
BODY TEMPERATURE: ABNORMAL
BUN SERPL-MCNC: 15 MG/DL (ref 6–23)
CA-I BLDA-SCNC: 1.26 MMOL/L (ref 1.1–1.33)
CALCIUM SERPL-MCNC: 9.2 MG/DL (ref 8.6–10.3)
CARDIAC TROPONIN I PNL SERPL HS: 21 NG/L (ref 0–13)
CHLORIDE BLDA-SCNC: 104 MMOL/L (ref 98–107)
CHLORIDE SERPL-SCNC: 106 MMOL/L (ref 98–107)
CO2 SERPL-SCNC: 27 MMOL/L (ref 21–32)
COLOR UR: YELLOW
CREAT SERPL-MCNC: 0.67 MG/DL (ref 0.5–1.05)
EGFRCR SERPLBLD CKD-EPI 2021: 83 ML/MIN/1.73M*2
EOSINOPHIL # BLD AUTO: 0.11 X10*3/UL (ref 0–0.4)
EOSINOPHIL NFR BLD AUTO: 0.6 %
ERYTHROCYTE [DISTWIDTH] IN BLOOD BY AUTOMATED COUNT: 14.6 % (ref 11.5–14.5)
ERYTHROCYTE [DISTWIDTH] IN BLOOD BY AUTOMATED COUNT: 14.6 % (ref 11.5–14.5)
GLUCOSE BLDA-MCNC: 159 MG/DL (ref 74–99)
GLUCOSE SERPL-MCNC: 139 MG/DL (ref 74–99)
GLUCOSE UR STRIP.AUTO-MCNC: NORMAL MG/DL
HCO3 BLDA-SCNC: 24 MMOL/L (ref 22–26)
HCT VFR BLD AUTO: 30.4 % (ref 36–46)
HCT VFR BLD AUTO: 32.1 % (ref 36–46)
HCT VFR BLD EST: 32 % (ref 36–46)
HGB BLD-MCNC: 10.1 G/DL (ref 12–16)
HGB BLD-MCNC: 9.5 G/DL (ref 12–16)
HGB BLDA-MCNC: 10.8 G/DL (ref 12–16)
HYALINE CASTS #/AREA URNS AUTO: ABNORMAL /LPF
IMM GRANULOCYTES # BLD AUTO: 0.14 X10*3/UL (ref 0–0.5)
IMM GRANULOCYTES NFR BLD AUTO: 0.7 % (ref 0–0.9)
INHALED O2 CONCENTRATION: 21 %
INR PPP: 1.5 (ref 0.9–1.1)
KETONES UR STRIP.AUTO-MCNC: ABNORMAL MG/DL
LACTATE BLDA-SCNC: 1.1 MMOL/L (ref 0.4–2)
LEUKOCYTE ESTERASE UR QL STRIP.AUTO: NEGATIVE
LYMPHOCYTES # BLD AUTO: 1.1 X10*3/UL (ref 0.8–3)
LYMPHOCYTES NFR BLD AUTO: 5.7 %
MAGNESIUM SERPL-MCNC: 1.63 MG/DL (ref 1.6–2.4)
MCH RBC QN AUTO: 27.8 PG (ref 26–34)
MCH RBC QN AUTO: 28.4 PG (ref 26–34)
MCHC RBC AUTO-ENTMCNC: 31.3 G/DL (ref 32–36)
MCHC RBC AUTO-ENTMCNC: 31.5 G/DL (ref 32–36)
MCV RBC AUTO: 89 FL (ref 80–100)
MCV RBC AUTO: 90 FL (ref 80–100)
MONOCYTES # BLD AUTO: 1.29 X10*3/UL (ref 0.05–0.8)
MONOCYTES NFR BLD AUTO: 6.6 %
NEUTROPHILS # BLD AUTO: 16.72 X10*3/UL (ref 1.6–5.5)
NEUTROPHILS NFR BLD AUTO: 86.1 %
NITRITE UR QL STRIP.AUTO: NEGATIVE
NRBC BLD-RTO: 0 /100 WBCS (ref 0–0)
NRBC BLD-RTO: 0 /100 WBCS (ref 0–0)
OXYHGB MFR BLDA: 94.9 % (ref 94–98)
PCO2 BLDA: 33 MM HG (ref 38–42)
PH BLDA: 7.47 PH (ref 7.38–7.42)
PH UR STRIP.AUTO: 5.5 [PH]
PLATELET # BLD AUTO: 269 X10*3/UL (ref 150–450)
PLATELET # BLD AUTO: 294 X10*3/UL (ref 150–450)
PO2 BLDA: 75 MM HG (ref 85–95)
POTASSIUM BLDA-SCNC: 4 MMOL/L (ref 3.5–5.3)
POTASSIUM SERPL-SCNC: 4.2 MMOL/L (ref 3.5–5.3)
PROT SERPL-MCNC: 6 G/DL (ref 6.4–8.2)
PROT UR STRIP.AUTO-MCNC: ABNORMAL MG/DL
PROTHROMBIN TIME: 16.7 SECONDS (ref 9.8–12.8)
RBC # BLD AUTO: 3.42 X10*6/UL (ref 4–5.2)
RBC # BLD AUTO: 3.56 X10*6/UL (ref 4–5.2)
RBC # UR STRIP.AUTO: NEGATIVE /UL
RBC #/AREA URNS AUTO: ABNORMAL /HPF
SAO2 % BLDA: 97 % (ref 94–100)
SODIUM BLDA-SCNC: 138 MMOL/L (ref 136–145)
SODIUM SERPL-SCNC: 141 MMOL/L (ref 136–145)
SP GR UR STRIP.AUTO: 1.02
SQUAMOUS #/AREA URNS AUTO: ABNORMAL /HPF
UROBILINOGEN UR STRIP.AUTO-MCNC: NORMAL MG/DL
WBC # BLD AUTO: 18.6 X10*3/UL (ref 4.4–11.3)
WBC # BLD AUTO: 19.4 X10*3/UL (ref 4.4–11.3)
WBC #/AREA URNS AUTO: ABNORMAL /HPF

## 2024-04-12 PROCEDURE — 83880 ASSAY OF NATRIURETIC PEPTIDE: CPT | Performed by: EMERGENCY MEDICINE

## 2024-04-12 PROCEDURE — 84132 ASSAY OF SERUM POTASSIUM: CPT | Performed by: EMERGENCY MEDICINE

## 2024-04-12 PROCEDURE — 85610 PROTHROMBIN TIME: CPT | Performed by: EMERGENCY MEDICINE

## 2024-04-12 PROCEDURE — 2500000002 HC RX 250 W HCPCS SELF ADMINISTERED DRUGS (ALT 637 FOR MEDICARE OP, ALT 636 FOR OP/ED): Performed by: INTERNAL MEDICINE

## 2024-04-12 PROCEDURE — 71275 CT ANGIOGRAPHY CHEST: CPT | Mod: FOREIGN READ | Performed by: RADIOLOGY

## 2024-04-12 PROCEDURE — 71275 CT ANGIOGRAPHY CHEST: CPT

## 2024-04-12 PROCEDURE — 96376 TX/PRO/DX INJ SAME DRUG ADON: CPT

## 2024-04-12 PROCEDURE — 36415 COLL VENOUS BLD VENIPUNCTURE: CPT | Performed by: EMERGENCY MEDICINE

## 2024-04-12 PROCEDURE — 2060000001 HC INTERMEDIATE ICU ROOM DAILY

## 2024-04-12 PROCEDURE — G0378 HOSPITAL OBSERVATION PER HR: HCPCS

## 2024-04-12 PROCEDURE — 84484 ASSAY OF TROPONIN QUANT: CPT | Performed by: EMERGENCY MEDICINE

## 2024-04-12 PROCEDURE — 96365 THER/PROPH/DIAG IV INF INIT: CPT

## 2024-04-12 PROCEDURE — 85027 COMPLETE CBC AUTOMATED: CPT | Performed by: INTERNAL MEDICINE

## 2024-04-12 PROCEDURE — 83735 ASSAY OF MAGNESIUM: CPT | Performed by: EMERGENCY MEDICINE

## 2024-04-12 PROCEDURE — 93005 ELECTROCARDIOGRAM TRACING: CPT

## 2024-04-12 PROCEDURE — 2500000004 HC RX 250 GENERAL PHARMACY W/ HCPCS (ALT 636 FOR OP/ED): Performed by: INTERNAL MEDICINE

## 2024-04-12 PROCEDURE — 36600 WITHDRAWAL OF ARTERIAL BLOOD: CPT

## 2024-04-12 PROCEDURE — 85730 THROMBOPLASTIN TIME PARTIAL: CPT | Performed by: INTERNAL MEDICINE

## 2024-04-12 PROCEDURE — 2550000001 HC RX 255 CONTRASTS: Performed by: EMERGENCY MEDICINE

## 2024-04-12 PROCEDURE — 2500000002 HC RX 250 W HCPCS SELF ADMINISTERED DRUGS (ALT 637 FOR MEDICARE OP, ALT 636 FOR OP/ED): Mod: MUE | Performed by: INTERNAL MEDICINE

## 2024-04-12 PROCEDURE — 99285 EMERGENCY DEPT VISIT HI MDM: CPT | Mod: 25

## 2024-04-12 PROCEDURE — 2500000001 HC RX 250 WO HCPCS SELF ADMINISTERED DRUGS (ALT 637 FOR MEDICARE OP): Performed by: INTERNAL MEDICINE

## 2024-04-12 PROCEDURE — 96366 THER/PROPH/DIAG IV INF ADDON: CPT

## 2024-04-12 PROCEDURE — 2500000004 HC RX 250 GENERAL PHARMACY W/ HCPCS (ALT 636 FOR OP/ED): Performed by: EMERGENCY MEDICINE

## 2024-04-12 PROCEDURE — 85025 COMPLETE CBC W/AUTO DIFF WBC: CPT | Performed by: EMERGENCY MEDICINE

## 2024-04-12 PROCEDURE — 96361 HYDRATE IV INFUSION ADD-ON: CPT

## 2024-04-12 PROCEDURE — 81001 URINALYSIS AUTO W/SCOPE: CPT | Performed by: EMERGENCY MEDICINE

## 2024-04-12 PROCEDURE — 96375 TX/PRO/DX INJ NEW DRUG ADDON: CPT

## 2024-04-12 RX ORDER — BISACODYL 5 MG
10 TABLET, DELAYED RELEASE (ENTERIC COATED) ORAL DAILY PRN
Status: DISCONTINUED | OUTPATIENT
Start: 2024-04-12 | End: 2024-04-13 | Stop reason: HOSPADM

## 2024-04-12 RX ORDER — TALC
3 POWDER (GRAM) TOPICAL NIGHTLY
Status: DISCONTINUED | OUTPATIENT
Start: 2024-04-12 | End: 2024-04-13 | Stop reason: HOSPADM

## 2024-04-12 RX ORDER — POLYETHYLENE GLYCOL 3350 17 G/17G
17 POWDER, FOR SOLUTION ORAL DAILY
Status: DISCONTINUED | OUTPATIENT
Start: 2024-04-13 | End: 2024-04-13 | Stop reason: HOSPADM

## 2024-04-12 RX ORDER — SERTRALINE HYDROCHLORIDE 50 MG/1
50 TABLET, FILM COATED ORAL DAILY
Status: DISCONTINUED | OUTPATIENT
Start: 2024-04-13 | End: 2024-04-13 | Stop reason: HOSPADM

## 2024-04-12 RX ORDER — PANTOPRAZOLE SODIUM 20 MG/1
20 TABLET, DELAYED RELEASE ORAL
Status: DISCONTINUED | OUTPATIENT
Start: 2024-04-13 | End: 2024-04-13 | Stop reason: HOSPADM

## 2024-04-12 RX ORDER — SUCRALFATE 1 G/1
1 TABLET ORAL
Status: DISCONTINUED | OUTPATIENT
Start: 2024-04-13 | End: 2024-04-13 | Stop reason: HOSPADM

## 2024-04-12 RX ORDER — INSULIN LISPRO 100 [IU]/ML
0-10 INJECTION, SOLUTION INTRAVENOUS; SUBCUTANEOUS
Status: DISCONTINUED | OUTPATIENT
Start: 2024-04-13 | End: 2024-04-13 | Stop reason: HOSPADM

## 2024-04-12 RX ORDER — BUSPIRONE HYDROCHLORIDE 10 MG/1
20 TABLET ORAL 2 TIMES DAILY
Status: DISCONTINUED | OUTPATIENT
Start: 2024-04-12 | End: 2024-04-13 | Stop reason: HOSPADM

## 2024-04-12 RX ORDER — DEXTROSE 50 % IN WATER (D50W) INTRAVENOUS SYRINGE
25
Status: DISCONTINUED | OUTPATIENT
Start: 2024-04-12 | End: 2024-04-13 | Stop reason: HOSPADM

## 2024-04-12 RX ORDER — QUETIAPINE FUMARATE 50 MG/1
50 TABLET, FILM COATED ORAL 2 TIMES DAILY
Status: DISCONTINUED | OUTPATIENT
Start: 2024-04-12 | End: 2024-04-13 | Stop reason: HOSPADM

## 2024-04-12 RX ORDER — HEPARIN SODIUM 10000 [USP'U]/100ML
0-4500 INJECTION, SOLUTION INTRAVENOUS CONTINUOUS
Status: DISCONTINUED | OUTPATIENT
Start: 2024-04-12 | End: 2024-04-13

## 2024-04-12 RX ORDER — HEPARIN SODIUM 10000 [USP'U]/100ML
0-4500 INJECTION, SOLUTION INTRAVENOUS CONTINUOUS
Status: DISCONTINUED | OUTPATIENT
Start: 2024-04-12 | End: 2024-04-12 | Stop reason: HOSPADM

## 2024-04-12 RX ORDER — MESALAMINE 1.2 G/1
1.2 TABLET, DELAYED RELEASE ORAL
Status: DISCONTINUED | OUTPATIENT
Start: 2024-04-13 | End: 2024-04-13 | Stop reason: HOSPADM

## 2024-04-12 RX ORDER — AMOXICILLIN 250 MG
2 CAPSULE ORAL 2 TIMES DAILY
Status: DISCONTINUED | OUTPATIENT
Start: 2024-04-12 | End: 2024-04-13 | Stop reason: HOSPADM

## 2024-04-12 RX ORDER — TRAZODONE HYDROCHLORIDE 50 MG/1
50 TABLET ORAL NIGHTLY
Status: DISCONTINUED | OUTPATIENT
Start: 2024-04-12 | End: 2024-04-13 | Stop reason: HOSPADM

## 2024-04-12 RX ORDER — LORAZEPAM 2 MG/ML
0.5 INJECTION INTRAMUSCULAR ONCE
Status: COMPLETED | OUTPATIENT
Start: 2024-04-12 | End: 2024-04-12

## 2024-04-12 RX ORDER — HEPARIN SODIUM 5000 [USP'U]/ML
2000-4000 INJECTION, SOLUTION INTRAVENOUS; SUBCUTANEOUS AS NEEDED
Status: DISCONTINUED | OUTPATIENT
Start: 2024-04-12 | End: 2024-04-13

## 2024-04-12 RX ORDER — DEXTROSE 50 % IN WATER (D50W) INTRAVENOUS SYRINGE
12.5
Status: DISCONTINUED | OUTPATIENT
Start: 2024-04-12 | End: 2024-04-13 | Stop reason: HOSPADM

## 2024-04-12 RX ADMIN — HEPARIN SODIUM 1200 UNITS/HR: 10000 INJECTION, SOLUTION INTRAVENOUS at 22:54

## 2024-04-12 RX ADMIN — SENNOSIDES AND DOCUSATE SODIUM 2 TABLET: 50; 8.6 TABLET ORAL at 22:54

## 2024-04-12 RX ADMIN — HEPARIN SODIUM 1224 UNITS/HR: 10000 INJECTION, SOLUTION INTRAVENOUS at 18:14

## 2024-04-12 RX ADMIN — BUSPIRONE HYDROCHLORIDE 20 MG: 10 TABLET ORAL at 22:54

## 2024-04-12 RX ADMIN — TRAZODONE HYDROCHLORIDE 50 MG: 50 TABLET ORAL at 22:53

## 2024-04-12 RX ADMIN — IOHEXOL 75 ML: 350 INJECTION, SOLUTION INTRAVENOUS at 15:47

## 2024-04-12 RX ADMIN — LORAZEPAM 0.5 MG: 2 INJECTION INTRAMUSCULAR; INTRAVENOUS at 15:28

## 2024-04-12 RX ADMIN — SODIUM CHLORIDE 1000 ML: 9 INJECTION, SOLUTION INTRAVENOUS at 15:00

## 2024-04-12 RX ADMIN — QUETIAPINE FUMARATE 50 MG: 50 TABLET ORAL at 22:54

## 2024-04-12 RX ADMIN — Medication 3 MG: at 22:54

## 2024-04-12 SDOH — SOCIAL STABILITY: SOCIAL INSECURITY: DO YOU FEEL ANYONE HAS EXPLOITED OR TAKEN ADVANTAGE OF YOU FINANCIALLY OR OF YOUR PERSONAL PROPERTY?: NO

## 2024-04-12 SDOH — SOCIAL STABILITY: SOCIAL INSECURITY: DOES ANYONE TRY TO KEEP YOU FROM HAVING/CONTACTING OTHER FRIENDS OR DOING THINGS OUTSIDE YOUR HOME?: NO

## 2024-04-12 SDOH — SOCIAL STABILITY: SOCIAL INSECURITY: HAS ANYONE EVER THREATENED TO HURT YOUR FAMILY OR YOUR PETS?: NO

## 2024-04-12 SDOH — SOCIAL STABILITY: SOCIAL INSECURITY: WERE YOU ABLE TO COMPLETE ALL THE BEHAVIORAL HEALTH SCREENINGS?: YES

## 2024-04-12 SDOH — SOCIAL STABILITY: SOCIAL INSECURITY: ARE YOU OR HAVE YOU BEEN THREATENED OR ABUSED PHYSICALLY, EMOTIONALLY, OR SEXUALLY BY ANYONE?: NO

## 2024-04-12 SDOH — SOCIAL STABILITY: SOCIAL INSECURITY: DO YOU FEEL UNSAFE GOING BACK TO THE PLACE WHERE YOU ARE LIVING?: NO

## 2024-04-12 SDOH — SOCIAL STABILITY: SOCIAL INSECURITY: ARE THERE ANY APPARENT SIGNS OF INJURIES/BEHAVIORS THAT COULD BE RELATED TO ABUSE/NEGLECT?: NO

## 2024-04-12 SDOH — SOCIAL STABILITY: SOCIAL INSECURITY: ABUSE: ADULT

## 2024-04-12 SDOH — SOCIAL STABILITY: SOCIAL INSECURITY: HAVE YOU HAD THOUGHTS OF HARMING ANYONE ELSE?: NO

## 2024-04-12 ASSESSMENT — LIFESTYLE VARIABLES
HOW OFTEN DO YOU HAVE 6 OR MORE DRINKS ON ONE OCCASION: NEVER
SUBSTANCE_ABUSE_PAST_12_MONTHS: NO
SKIP TO QUESTIONS 9-10: 1
AUDIT-C TOTAL SCORE: 0
AUDIT-C TOTAL SCORE: 0
HOW OFTEN DO YOU HAVE A DRINK CONTAINING ALCOHOL: NEVER
HOW MANY STANDARD DRINKS CONTAINING ALCOHOL DO YOU HAVE ON A TYPICAL DAY: PATIENT DOES NOT DRINK
PRESCIPTION_ABUSE_PAST_12_MONTHS: NO

## 2024-04-12 ASSESSMENT — ACTIVITIES OF DAILY LIVING (ADL)
FEEDING YOURSELF: DEPENDENT
JUDGMENT_ADEQUATE_SAFELY_COMPLETE_DAILY_ACTIVITIES: NO
HEARING - RIGHT EAR: DIFFICULTY WITH NOISE
LACK_OF_TRANSPORTATION: PATIENT UNABLE TO ANSWER
WALKS IN HOME: DEPENDENT
TOILETING: DEPENDENT
ASSISTIVE_DEVICE: WHEELCHAIR;EYEGLASSES
HEARING - LEFT EAR: DIFFICULTY WITH NOISE
ADEQUATE_TO_COMPLETE_ADL: YES
PATIENT'S MEMORY ADEQUATE TO SAFELY COMPLETE DAILY ACTIVITIES?: NO
DRESSING YOURSELF: DEPENDENT
BATHING: DEPENDENT
GROOMING: DEPENDENT

## 2024-04-12 ASSESSMENT — PATIENT HEALTH QUESTIONNAIRE - PHQ9
2. FEELING DOWN, DEPRESSED OR HOPELESS: NEARLY EVERY DAY
SUM OF ALL RESPONSES TO PHQ9 QUESTIONS 1 & 2: 6
1. LITTLE INTEREST OR PLEASURE IN DOING THINGS: NEARLY EVERY DAY

## 2024-04-12 ASSESSMENT — COGNITIVE AND FUNCTIONAL STATUS - GENERAL
TURNING FROM BACK TO SIDE WHILE IN FLAT BAD: A LOT
EATING MEALS: A LOT
MOBILITY SCORE: 12
HELP NEEDED FOR BATHING: A LOT
DRESSING REGULAR UPPER BODY CLOTHING: A LOT
STANDING UP FROM CHAIR USING ARMS: A LOT
DAILY ACTIVITIY SCORE: 12
PERSONAL GROOMING: A LOT
MOVING TO AND FROM BED TO CHAIR: A LOT
MOVING FROM LYING ON BACK TO SITTING ON SIDE OF FLAT BED WITH BEDRAILS: A LOT
CLIMB 3 TO 5 STEPS WITH RAILING: A LOT
TOILETING: A LOT
PATIENT BASELINE BEDBOUND: NO
DRESSING REGULAR LOWER BODY CLOTHING: A LOT
WALKING IN HOSPITAL ROOM: A LOT

## 2024-04-12 ASSESSMENT — PAIN SCALES - PAIN ASSESSMENT IN ADVANCED DEMENTIA (PAINAD)
NEGVOCALIZATION: REPEATED TROUBLED CALLING OUT, LOUD MOANING/GROANING, CRYING
TOTALSCORE: 6
BREATHING: NORMAL
CONSOLABILITY: UNABLE TO CONSOLE, DISTRACT OR REASSURE
BODYLANGUAGE: TENSE, DISTRESSED PACING, FIDGETING
FACIALEXPRESSION: SAD, FRIGHTENED, FROWN

## 2024-04-12 ASSESSMENT — PAIN - FUNCTIONAL ASSESSMENT: PAIN_FUNCTIONAL_ASSESSMENT: PAINAD (PAIN ASSESSMENT IN ADVANCED DEMENTIA SCALE)

## 2024-04-12 NOTE — ED PROVIDER NOTES
HPI   Chief Complaint   Patient presents with    Rapid Heart Rate     Patient sent from facility where she reported to have a high heart rate and periods of apnea. She has been yelling out more than usual per staff as well . Patient has dementia       HPI  Patient is a 89-year-old female sent to the ED via EMS from [facility] geriatric inpatient psychiatry facility for concerns of tachycardia and apneic episodes.  Per chart review, patient has a history of atrial fibrillation (not anticoagulated), renal cell carcinoma with partial nephrectomy, non-insulin-dependent type 2 diabetes, prior cholecystectomy, Crohn's disease.  Patient is alert and oriented x 1 at baseline.  Per report from the facility, patient was apparently screaming out and then would go apneic.  They checked her vitals and found that her heart rate was high and blood pressure was low, so they sent her to the ED for further evaluation.  At this time, patient is calling out nonsensically.  She otherwise has no obvious concerns.                  No data recorded                   Patient History   No past medical history on file.  No past surgical history on file.  No family history on file.  Social History     Tobacco Use    Smoking status: Never     Passive exposure: Never    Smokeless tobacco: Never   Substance Use Topics    Alcohol use: Not on file    Drug use: Not on file       Physical Exam   ED Triage Vitals [04/12/24 1330]   Temperature Heart Rate Respirations BP   36.4 °C (97.5 °F) (!) 107 20 127/70      SpO2 Temp Source Heart Rate Source Patient Position   -- Temporal Monitor Lying      BP Location FiO2 (%)     Left arm --       Physical Exam  Vitals and nursing note reviewed.   Constitutional:       General: She is not in acute distress.     Appearance: She is not toxic-appearing.   HENT:      Head: Normocephalic.      Mouth/Throat:      Mouth: Mucous membranes are dry.   Eyes:      Extraocular Movements: Extraocular movements intact.       Conjunctiva/sclera: Conjunctivae normal.   Cardiovascular:      Rate and Rhythm: Tachycardia present. Rhythm irregular.      Pulses: Normal pulses.   Pulmonary:      Effort: Pulmonary effort is normal. No respiratory distress.      Breath sounds: Normal breath sounds. No wheezing.   Abdominal:      General: There is no distension.      Palpations: Abdomen is soft.      Tenderness: There is no abdominal tenderness.   Musculoskeletal:         General: No swelling.      Cervical back: Neck supple.   Skin:     General: Skin is warm and dry.      Capillary Refill: Capillary refill takes less than 2 seconds.   Neurological:      Mental Status: She is alert.      Comments: Alert and oriented only to self.  Moves all 4 extremities purposefully.  Reportedly consistent with patient's baseline         ED Course & MDM   ED Course as of 04/12/24 1851 Fri Apr 12, 2024   1346 EKG obtained at 1327, interpreted by myself.  Atrial fibrillation with ventricular rate of 107, left axis deviation with a left bundle branch block present, otherwise normal intervals, with no acute ischemic changes [VT]      ED Course User Index  [VT] Hannah WHITMAN MD         Diagnoses as of 04/12/24 1851   Single subsegmental pulmonary embolism without acute cor pulmonale (Multi)       Medical Decision Making  Patient was seen and evaluated for concerns of apnea.  Differential diagnosis includes but is not limited to ACS, PE, AAA, Aortic Dissection, Viral Infection, COPD, CHF.  On arrival, patient is yelling out nonsensically.  Vital signs are stable.  Initial EKG does not show any acute ischemic changes.  Patient is in A-fib with a ventricular rate of 107.  Patient is placed on a cardiac monitor with continuous pulse ox.  Additional labs and imaging are ordered for further evaluation of the patient's symptoms.  ABG is unremarkable.  pH is normal at 7.47, pCO2 of 33, pO2 75.  Of note, patient was recently here in the ED about 1 week ago for similar  symptoms.  At that time, she is found to be hypokalemic and hypomagnesemic.  She was transferred to [facility] and discharged on 4-4-2024.  Patient's lab work shows leukocytosis with a WBC of 19.4, likely reactive in nature.  Lab work is otherwise unremarkable.  CT angio chest for pulmonary embolism   Final Result   Distal branch pulmonary embolism in the right lower lobe.  There is no   evidence of right heart strain.   NOTIFICATION:  The critical results of the study were discussed with,   and acknowledged by Dr. Hannah Bravo by telephone on 04/12/24 at 4:52 PM.   Signed by Kike Crenshaw MD      Given findings consistent with PE, patient is started on heparin.  Daughter at the bedside explains that patient is not on anticoagulation due to her history of Crohn's disease and high bleeding risk.  At this time, I also had a conversation regarding patient's CODE STATUS with daughter at the bedside and patient's son on the phone.  Patient's son and daughter agree that patient should be a DNR, no intubation.  Family was informed of their lab and imaging results, and all questions and concerns were answered. As patient comes from [facility] inpatient geriatric psych facility, she cannot be admitted here at [place]. Therefore, transfer planning for further management was discussed at this time, to which family was agreeable.  I discussed the case with Dr. Guzman, hospitalist at [facility], who accepts patient for transfer.    Procedure  Procedures     Hannah WHITMAN MD  04/12/24 9997

## 2024-04-13 VITALS
SYSTOLIC BLOOD PRESSURE: 133 MMHG | OXYGEN SATURATION: 95 % | TEMPERATURE: 99 F | HEIGHT: 62 IN | DIASTOLIC BLOOD PRESSURE: 92 MMHG | RESPIRATION RATE: 19 BRPM | HEART RATE: 103 BPM | WEIGHT: 149.91 LBS | BODY MASS INDEX: 27.59 KG/M2

## 2024-04-13 LAB
APTT PPP: 117.2 SECONDS (ref 22–32.5)
GLUCOSE BLD MANUAL STRIP-MCNC: 112 MG/DL (ref 74–99)
HOLD SPECIMEN: NORMAL

## 2024-04-13 PROCEDURE — 36415 COLL VENOUS BLD VENIPUNCTURE: CPT | Performed by: INTERNAL MEDICINE

## 2024-04-13 PROCEDURE — 2500000001 HC RX 250 WO HCPCS SELF ADMINISTERED DRUGS (ALT 637 FOR MEDICARE OP): Performed by: INTERNAL MEDICINE

## 2024-04-13 PROCEDURE — 2500000002 HC RX 250 W HCPCS SELF ADMINISTERED DRUGS (ALT 637 FOR MEDICARE OP, ALT 636 FOR OP/ED): Performed by: INTERNAL MEDICINE

## 2024-04-13 PROCEDURE — 2500000004 HC RX 250 GENERAL PHARMACY W/ HCPCS (ALT 636 FOR OP/ED): Performed by: INTERNAL MEDICINE

## 2024-04-13 PROCEDURE — G0378 HOSPITAL OBSERVATION PER HR: HCPCS

## 2024-04-13 PROCEDURE — 82947 ASSAY GLUCOSE BLOOD QUANT: CPT

## 2024-04-13 PROCEDURE — 85730 THROMBOPLASTIN TIME PARTIAL: CPT | Performed by: INTERNAL MEDICINE

## 2024-04-13 PROCEDURE — 2500000002 HC RX 250 W HCPCS SELF ADMINISTERED DRUGS (ALT 637 FOR MEDICARE OP, ALT 636 FOR OP/ED): Mod: MUE | Performed by: INTERNAL MEDICINE

## 2024-04-13 RX ORDER — BISACODYL 5 MG
10 TABLET, DELAYED RELEASE (ENTERIC COATED) ORAL DAILY PRN
Qty: 100 TABLET | Refills: 0 | Status: SHIPPED | OUTPATIENT
Start: 2024-04-13

## 2024-04-13 RX ADMIN — SUCRALFATE 1 G: 1 TABLET ORAL at 06:43

## 2024-04-13 RX ADMIN — PANTOPRAZOLE SODIUM 20 MG: 20 TABLET, DELAYED RELEASE ORAL at 06:43

## 2024-04-13 RX ADMIN — APIXABAN 10 MG: 5 TABLET, FILM COATED ORAL at 08:17

## 2024-04-13 RX ADMIN — MESALAMINE 1.2 G: 800 TABLET, DELAYED RELEASE ORAL at 09:43

## 2024-04-13 RX ADMIN — SENNOSIDES AND DOCUSATE SODIUM 2 TABLET: 50; 8.6 TABLET ORAL at 09:23

## 2024-04-13 RX ADMIN — QUETIAPINE FUMARATE 50 MG: 50 TABLET ORAL at 09:23

## 2024-04-13 RX ADMIN — BUSPIRONE HYDROCHLORIDE 20 MG: 10 TABLET ORAL at 09:22

## 2024-04-13 RX ADMIN — SERTRALINE 50 MG: 50 TABLET, FILM COATED ORAL at 09:23

## 2024-04-13 ASSESSMENT — COGNITIVE AND FUNCTIONAL STATUS - GENERAL
DAILY ACTIVITIY SCORE: 12
MOBILITY SCORE: 12
TURNING FROM BACK TO SIDE WHILE IN FLAT BAD: A LOT
DRESSING REGULAR LOWER BODY CLOTHING: A LOT
MOVING FROM LYING ON BACK TO SITTING ON SIDE OF FLAT BED WITH BEDRAILS: A LOT
MOVING TO AND FROM BED TO CHAIR: A LOT
TOILETING: A LOT
PERSONAL GROOMING: A LOT
HELP NEEDED FOR BATHING: A LOT
DRESSING REGULAR UPPER BODY CLOTHING: A LOT
CLIMB 3 TO 5 STEPS WITH RAILING: A LOT
STANDING UP FROM CHAIR USING ARMS: A LOT
EATING MEALS: A LOT
WALKING IN HOSPITAL ROOM: A LOT

## 2024-04-13 ASSESSMENT — PAIN - FUNCTIONAL ASSESSMENT
PAIN_FUNCTIONAL_ASSESSMENT: 0-10

## 2024-04-13 ASSESSMENT — PAIN SCALES - PAIN ASSESSMENT IN ADVANCED DEMENTIA (PAINAD)
CONSOLABILITY: UNABLE TO CONSOLE, DISTRACT OR REASSURE
NEGVOCALIZATION: REPEATED TROUBLED CALLING OUT, LOUD MOANING/GROANING, CRYING
BREATHING: NORMAL
BODYLANGUAGE: TENSE, DISTRESSED PACING, FIDGETING
FACIALEXPRESSION: SAD, FRIGHTENED, FROWN
TOTALSCORE: 6

## 2024-04-13 ASSESSMENT — PAIN SCALES - GENERAL
PAINLEVEL_OUTOF10: 0 - NO PAIN

## 2024-04-13 NOTE — CARE PLAN
Over the shift, the patient did make progress toward the following goals.       Problem: Pain  Goal: My pain/discomfort is manageable  Outcome: Progressing     Problem: Safety  Goal: I will remain free of falls  Outcome: Progressing     Problem: Psychosocial Needs  Goal: Collaborate with me, my family, and caregiver to identify my specific goals  Outcome: Progressing  Flowsheets (Taken 4/12/2024 2146)  Cultural Requests During Hospitalization: none  Spiritual Requests During Hospitalization: none, strong Zoroastrian isela     Problem: Skin  Goal: Prevent/manage excess moisture  Outcome: Progressing  Flowsheets (Taken 4/13/2024 2042)  Prevent/manage excess moisture: Cleanse incontinence/protect with barrier cream  Goal: Prevent/minimize sheer/friction injuries  Outcome: Progressing  Flowsheets (Taken 4/13/2024 5579)  Prevent/minimize sheer/friction injuries:   Turn/reposition every 2 hours/use positioning/transfer devices   Use pull sheet   HOB 30 degrees or less     The patient's goals for the shift include rest    The clinical goals for the shift include monitor labs

## 2024-04-13 NOTE — PROGRESS NOTES
This RN TCC received notice from nurse that patient to return to [facility] [phone] today.  Spoke to the facility regarding patient returning.  Documents to be faxed to [phone].  N2N report to be called prior to patient's return [phone] option 2.  Patient is set up for transport at 11 am via [facility].  Bedside nurse advised of same.  RN TCC following.    Mable Davis RN

## 2024-04-13 NOTE — DISCHARGE SUMMARY
Discharge Diagnosis  Personal history of PE (pulmonary embolism)    Issues Requiring Follow-Up  General medical follow-up    Discharge Meds     Your medication list        START taking these medications        Instructions Last Dose Given Next Dose Due   apixaban 5 mg tablet  Commonly known as: Eliquis  Start taking on: April 13, 2024      Take 2 tablets (10 mg) by mouth 2 times a day for 7 days, THEN 1 tablet (5 mg) 2 times a day.              CONTINUE taking these medications        Instructions Last Dose Given Next Dose Due   bisacodyl 5 mg EC tablet  Commonly known as: Dulcolax      Take 2 tablets (10 mg) by mouth once daily as needed for constipation. Do not crush, chew, or split.       busPIRone 10 mg tablet  Commonly known as: Buspar           busPIRone 10 mg tablet  Commonly known as: Buspar           furosemide 40 mg tablet  Commonly known as: Lasix           glipiZIDE 5 mg tablet  Commonly known as: Glucotrol           insulin lispro 100 unit/mL injection  Commonly known as: HumaLOG           lidocaine 4 % patch           losartan 25 mg tablet  Commonly known as: Cozaar           melatonin 3 mg tablet           mesalamine 400 mg DR capsule  Commonly known as: Delzicol      Take 1 capsule (400 mg) by mouth 2 times a day.       metFORMIN 1,000 mg tablet  Commonly known as: Glucophage           ondansetron ODT 4 mg disintegrating tablet  Commonly known as: Zofran-ODT           pantoprazole 20 mg EC tablet  Commonly known as: ProtoNix           polyethylene glycol 17 gram packet  Commonly known as: Glycolax, Miralax      Take 17 g by mouth once daily. Do not start before April 6, 2024.       QUEtiapine 50 mg tablet  Commonly known as: SEROquel           sennosides-docusate sodium 8.6-50 mg tablet  Commonly known as: Quyen-Colace      Take 2 tablets by mouth 2 times a day.       sertraline 50 mg tablet  Commonly known as: Zoloft           sucralfate 1 gram tablet  Commonly known as: Carafate           traZODone  50 mg tablet  Commonly known as: Desyrel                     Where to Get Your Medications        These medications were sent to [facility]  [address], [address], [place] [address]      Hours: 9 AM to 6 PM Mon-Fri, 9 AM to 1 PM Sat Phone: [phone]   bisacodyl 5 mg EC tablet       Information about where to get these medications is not yet available    Ask your nurse or doctor about these medications  apixaban 5 mg tablet         Test Results Pending At Discharge  Pending Labs       No current pending labs.            Hospital Course   Patient transferred from [facility] after being admitted to the emergency department from a Psychiatric facility due to chief complaint of weakness.  Patient had workup and found to have a single small segmental pulmonary embolus.  Patient was transferred to us for observation on anticoagulation.  Patient tolerated anticoagulation well.  Patient apparently had GI bleeding about 10 years ago from Crohn's disease, hemoglobin remained stable on heparin.  Patient was transition to oral anticoagulation with apixaban per standard dosing for PE.  Patient will need to be on this 3 to 6 months.  Patient's daughter from [place] was in the room today and I explained discharge plans.  Patient will be discharged back to Psychiatric facility in stable condition.  Patient did not require oxygen and had stable vital signs throughout all of her hospital stays during this episode.    Pertinent Physical Exam At Time of Discharge  Physical Exam  Exam stable for discharge specifically:  Cardiovascular S1 S2 regular rate rhythm  Lungs clear to auscultation bilaterally  Outpatient Follow-Up  No future appointments.    Total time spent discharge 40 minutes  Jaison Guzman MD

## 2024-04-13 NOTE — H&P
History Of Present Illness  Viktoriya Lyon is a 89 y.o. female presenting with tachycardia. Per ED physician's note: Patient sent from facility where she reported to have a high heart rate and periods of apnea. She has been yelling out more than usual per staff as well . Patient has dementia.  Pt can not provide any story.  Pt's daughter is at bedside, She drove here from [place]. She was told by a nursing home personnel that Pt was more lethargic today. Her BP was low, she had 1 l of NS and was sent to the ED.  In the ED, Pt had a CT which demonstrated a new PE. CT done 1 wk ago - no PE. No Hx of DVT/PE in the past. Pt used to ambulate from her bed to the bathroom, during last month she does not ambulate.     Past Medical History  Per chart review, patient has a history of atrial fibrillation (not anticoagulated), renal cell carcinoma with partial nephrectomy, non-insulin-dependent type 2 diabetes, prior cholecystectomy, Crohn's disease.  Patient is alert and oriented x 1 at baseline.    Pt had 1 episode of severe GI bleeding 10 years ago due toCrohn's disease. NO recent episodes of bleeding.  Was admitted to [facility] 1 wk ago with abdominal pain, hypomagnesemia, hypokalemia, constipation.   Surgical History  She has no past surgical history on file.     Social History  She reports that she has never smoked. She has never been exposed to tobacco smoke. She has never used smokeless tobacco. No history on file for alcohol use and drug use.  Lives in [facility] for 1 [facility]. Was [facility].  Family History  No family history on file.   Pt cannot provide  Allergies  Bee pollen, Gold au 198, and Penicillins    Review of Systems   Pt cannot provide  Physical Exam   Location: .  Pt's daughter is at bedside.  Pi is in no apparent respiratory distress.   Sleeping.  Nontoxic-appearing.  Comfortable at rest on room air.  Skin is clean, dry.  No skin lesions, rashes, ecchymosis.  Head  is atraumatic, normocephalic.  Mouth mucosa is pink and moist.  No mucosal lesions.  No nasal discharge.  Musculoskeletal: No deformities, no muscle swelling.  No point tenderness to palpation.  Neck is supple, no JVD, no carotid bruits.  No lymphadenopathy.  Chest is atraumatic.  No tenderness to palpation.  Lungs are clear to auscultation bilaterally.  No wheezes, no rales, no rhonchi.  Heart: Regular rate and rhythm S1-S2. Loud systolic murmur.  Peripheral pulses are palpable in all extremities, equal.  Abdomen is soft, not tender, not distended.  Bowel sounds positive in all quadrants.  No rebound, no guarding.  Full exam deferred.  Extremities: No peripheral edema, cords, cyanosis, varices.  Neuro: Patient is sleeping. Moves all extremities.  No gross focal neurological deficits.  Face is symmetrical.   Psychiatric: Cannot be assessed.  Last Recorded Vitals  /76 (BP Location: Right arm, Patient Position: Lying)   Pulse 108   Temp 36.5 °C (97.7 °F) (Axillary)   Resp 17   SpO2 100%     Relevant Results        CT angio chest for pulmonary embolism    Result Date: 4/12/2024  STUDY: CT Angiogram of the Chest; 04/12/2024, 3:48 PM. INDICATION: Atrial fibrillation. COMPARISON: CTA chest: 04/03/24. ACCESSION NUMBER(S): [id number] ORDERING CLINICIAN: SONIA DIANA TECHNIQUE:  CTA of the chest was performed with intravenous contrast. Images are reviewed and processed at a workstation according to the CT angiogram protocol with 3-D and/or MIP post processing imaging generated.  Omnipaque 350 75 mL was administered intravenously. Automated mA/kV exposure control was utilized and patient examination was performed in strict accordance with principles of ALARA. FINDINGS: Pulmonary arteries are adequately opacified.  There is a filling defect in the distal pulmonary artery to the right lower lobe.  This is best seen on series 401, slice 164 and is consistent with a pulmonary embolism. The heart is normal in size without  pericardial effusion.  Thoracic lymph nodes are not enlarged. There is no pleural effusion, pleural thickening, or pneumothorax. The airways are patent. There are infiltrates posteriorly in the lung bases most likely representing atelectasis. Upper abdomen demonstrates no acute pathology. There are no acute fractures.  No suspicious bony lesions.    Distal branch pulmonary embolism in the right lower lobe.  There is no evidence of right heart strain. NOTIFICATION:  The critical results of the study were discussed with, and acknowledged by Dr. Hannah Bravo by telephone on 04/12/24 at 4:52 PM. Signed by Kike Crenshaw MD    ECG 12 lead    Result Date: 4/6/2024  Atrial fibrillation with premature ventricular or aberrantly conducted complexes Nonspecific intraventricular conduction delay ST & T wave abnormality, consider lateral ischemia Abnormal ECG No previous ECGs available See ED provider note for full interpretation and clinical correlation Confirmed by Delaney Medina ([id number]) on 4/6/2024 10:40:28 AM    CT abdomen pelvis w IV contrast    Result Date: 4/3/2024  STUDY: CT Angiogram of the Chest, CT Abdomen and Pelvis with IV Contrast; 4/3/2024 1:40 PM. INDICATION: Increased lethargy, abdominal pain and leukocytosis; Evaluate for pulmonary embolism. COMPARISON: Chest radiograph performed the same date. ACCESSION NUMBER(S): [id number], [id number] ORDERING CLINICIAN: CHELSEA EDMOND TECHNIQUE:  CTA of the chest was performed following rapid injection of intravenous contrast.  Images are reviewed and processed at a workstation according to the CT angiogram protocol with 3-D and/or MIP post processing imaging generated.  CT of the abdomen and pelvis was performed with intravenous contrast.  Omnipaque 350, 75 mL was administered intravenously; positive oral contrast was given. Automated mA/kV exposure control was utilized and patient examination was performed in strict accordance with principles of ALARA. FINDINGS:  CTA CHEST:  Pulmonary arteries are adequately opacified without acute or chronic filling defects.  The thoracic aorta is normal in course and caliber without dissection or aneurysm.  Atherosclerosis of the thoracic aorta and coronary arteries is present.  Heart is enlarged without pericardial effusion.  Thoracic lymph nodes are not enlarged. There is no pleural thickening or pneumothorax.  The airways are patent. Small bilateral pleural effusions are present.  Mild atelectatic changes are present.  LEFT shoulder arthroplasty appears well seated.  Severe degenerative changes of the RIGHT glenohumeral joint is present.  Multilevel degenerative changes are seen throughout the thoracic spine. ABDOMEN:  LIVER: No hepatomegaly.  Nodular cirrhotic morphology of the liver is present without discrete hepatic lesion.  Normal attenuation.  BILE DUCTS: No intrahepatic or extrahepatic biliary ductal dilatation.  GALLBLADDER: Patient is status post cholecystectomy.  STOMACH: No abnormalities identified.  PANCREAS: Severe pancreatic atrophy noted.  SPLEEN: No splenomegaly or focal splenic lesion.  ADRENAL GLANDS: Adenomatous hypertrophy of the adrenal glands is present bilaterally.  KIDNEYS AND URETERS: Kidneys are normal in size and location.  No renal or ureteral calculi.  Simple appearing LEFT renal cysts are present measuring up to 4.4 cm.  No suspicious renal mass or hydronephrosis demonstrated.  PELVIS:  BLADDER: Urinary bladder is distended.  REPRODUCTIVE ORGANS: No abnormalities identified.  BOWEL: Moderate amount of stool throughout the colon is present. Constipation is not excluded.  Large amount of stool in the rectum is present.  Developing fecal impaction is a concern.  VESSELS: Atherosclerosis of the abdominal aorta and iliac arteries is noted. Abdominal aorta is normal in caliber.  PERITONEUM/RETROPERITONEUM/LYMPH NODES: No free fluid.  No pneumoperitoneum. No lymphadenopathy.  ABDOMINAL WALL: No abnormalities identified. SOFT  TISSUES: Scattered phleboliths are present within the pelvis.  BONES: No acute fracture or aggressive osseous lesion.  Multilevel degenerative change of lumbar spine is present.  Chronic appearing compression fracture of L2 is present.  Grade 1 anterolisthesis of L4 on L5 and L5 on S1 is present.  Levoconvex curvature of the thoracolumbar spine is present.  Mild to moderate degenerative change of the hips demonstrated.    1. There is no evidence of pulmonary embolus. 2. Small bilateral pleural effusions noted. Cardiomegaly noted. Atherosclerosis of the thoracic aorta and coronary arteries is present. 3. Nodular cirrhotic morphology of liver. 4. Moderate amount of stool throughout the colon and rectum. Fecal impaction not excluded. 5. Urinary bladder distention. Signed by Dnao Mckeon II, MD    CT angio chest for pulmonary embolism    Result Date: 4/3/2024  STUDY: CT Angiogram of the Chest, CT Abdomen and Pelvis with IV Contrast; 4/3/2024 1:40 PM. INDICATION: Increased lethargy, abdominal pain and leukocytosis; Evaluate for pulmonary embolism. COMPARISON: Chest radiograph performed the same date. ACCESSION NUMBER(S): [id number], [id number] ORDERING CLINICIAN: CHELSEA EDMOND TECHNIQUE:  CTA of the chest was performed following rapid injection of intravenous contrast.  Images are reviewed and processed at a workstation according to the CT angiogram protocol with 3-D and/or MIP post processing imaging generated.  CT of the abdomen and pelvis was performed with intravenous contrast.  Omnipaque 350, 75 mL was administered intravenously; positive oral contrast was given. Automated mA/kV exposure control was utilized and patient examination was performed in strict accordance with principles of ALARA. FINDINGS:  CTA CHEST: Pulmonary arteries are adequately opacified without acute or chronic filling defects.  The thoracic aorta is normal in course and caliber without dissection or aneurysm.  Atherosclerosis of the  thoracic aorta and coronary arteries is present.  Heart is enlarged without pericardial effusion.  Thoracic lymph nodes are not enlarged. There is no pleural thickening or pneumothorax.  The airways are patent. Small bilateral pleural effusions are present.  Mild atelectatic changes are present.  LEFT shoulder arthroplasty appears well seated.  Severe degenerative changes of the RIGHT glenohumeral joint is present.  Multilevel degenerative changes are seen throughout the thoracic spine. ABDOMEN:  LIVER: No hepatomegaly.  Nodular cirrhotic morphology of the liver is present without discrete hepatic lesion.  Normal attenuation.  BILE DUCTS: No intrahepatic or extrahepatic biliary ductal dilatation.  GALLBLADDER: Patient is status post cholecystectomy.  STOMACH: No abnormalities identified.  PANCREAS: Severe pancreatic atrophy noted.  SPLEEN: No splenomegaly or focal splenic lesion.  ADRENAL GLANDS: Adenomatous hypertrophy of the adrenal glands is present bilaterally.  KIDNEYS AND URETERS: Kidneys are normal in size and location.  No renal or ureteral calculi.  Simple appearing LEFT renal cysts are present measuring up to 4.4 cm.  No suspicious renal mass or hydronephrosis demonstrated.  PELVIS:  BLADDER: Urinary bladder is distended.  REPRODUCTIVE ORGANS: No abnormalities identified.  BOWEL: Moderate amount of stool throughout the colon is present. Constipation is not excluded.  Large amount of stool in the rectum is present.  Developing fecal impaction is a concern.  VESSELS: Atherosclerosis of the abdominal aorta and iliac arteries is noted. Abdominal aorta is normal in caliber.  PERITONEUM/RETROPERITONEUM/LYMPH NODES: No free fluid.  No pneumoperitoneum. No lymphadenopathy.  ABDOMINAL WALL: No abnormalities identified. SOFT TISSUES: Scattered phleboliths are present within the pelvis.  BONES: No acute fracture or aggressive osseous lesion.  Multilevel degenerative change of lumbar spine is present.  Chronic  appearing compression fracture of L2 is present.  Grade 1 anterolisthesis of L4 on L5 and L5 on S1 is present.  Levoconvex curvature of the thoracolumbar spine is present.  Mild to moderate degenerative change of the hips demonstrated.    1. There is no evidence of pulmonary embolus. 2. Small bilateral pleural effusions noted. Cardiomegaly noted. Atherosclerosis of the thoracic aorta and coronary arteries is present. 3. Nodular cirrhotic morphology of liver. 4. Moderate amount of stool throughout the colon and rectum. Fecal impaction not excluded. 5. Urinary bladder distention. Signed by Dano Mckeon II, MD    XR chest 1 view    Result Date: 4/3/2024  STUDY: Chest Radiograph;  4/3/2024 11:54 AM. INDICATION: Increased lethargy. COMPARISON: None Available. ACCESSION NUMBER(S): [id number] ORDERING CLINICIAN: CHELSEA EDMOND TECHNIQUE:  Frontal chest was obtained at 11:53 hours. FINDINGS: CARDIOMEDIASTINAL SILHOUETTE: Heart is enlarged with increased pulmonary vascularity.  LUNGS: Question trace pleural effusions.  ABDOMEN: No remarkable upper abdominal findings.  BONES: No acute osseous changes.    Vascular congestion with trace pleural effusions. Signed by Javed Perez MD       Results for orders placed or performed during the hospital encounter of 04/12/24 (from the past 24 hour(s))   BLOOD GAS ARTERIAL FULL PANEL   Result Value Ref Range    POCT pH, Arterial 7.47 (H) 7.38 - 7.42 pH    POCT pCO2, Arterial 33 (L) 38 - 42 mm Hg    POCT pO2, Arterial 75 (L) 85 - 95 mm Hg    POCT SO2, Arterial 97 94 - 100 %    POCT Oxy Hemoglobin, Arterial 94.9 94.0 - 98.0 %    POCT Hematocrit Calculated, Arterial 32.0 (L) 36.0 - 46.0 %    POCT Sodium, Arterial 138 136 - 145 mmol/L    POCT Potassium, Arterial 4.0 3.5 - 5.3 mmol/L    POCT Chloride, Arterial 104 98 - 107 mmol/L    POCT Ionized Calcium, Arterial 1.26 1.10 - 1.33 mmol/L    POCT Glucose, Arterial 159 (H) 74 - 99 mg/dL    POCT Lactate, Arterial 1.1 0.4 - 2.0 mmol/L     POCT Base Excess, Arterial 0.7 -2.0 - 3.0 mmol/L    POCT HCO3 Calculated, Arterial 24.0 22.0 - 26.0 mmol/L    POCT Hemoglobin, Arterial 10.8 (L) 12.0 - 16.0 g/dL    POCT Anion Gap, Arterial 14 10 - 25 mmo/L    Patient Temperature      FiO2 21 %   CBC and Auto Differential   Result Value Ref Range    WBC 19.4 (H) 4.4 - 11.3 x10*3/uL    nRBC 0.0 0.0 - 0.0 /100 WBCs    RBC 3.56 (L) 4.00 - 5.20 x10*6/uL    Hemoglobin 10.1 (L) 12.0 - 16.0 g/dL    Hematocrit 32.1 (L) 36.0 - 46.0 %    MCV 90 80 - 100 fL    MCH 28.4 26.0 - 34.0 pg    MCHC 31.5 (L) 32.0 - 36.0 g/dL    RDW 14.6 (H) 11.5 - 14.5 %    Platelets 294 150 - 450 x10*3/uL    Neutrophils % 86.1 40.0 - 80.0 %    Immature Granulocytes %, Automated 0.7 0.0 - 0.9 %    Lymphocytes % 5.7 13.0 - 44.0 %    Monocytes % 6.6 2.0 - 10.0 %    Eosinophils % 0.6 0.0 - 6.0 %    Basophils % 0.3 0.0 - 2.0 %    Neutrophils Absolute 16.72 (H) 1.60 - 5.50 x10*3/uL    Immature Granulocytes Absolute, Automated 0.14 0.00 - 0.50 x10*3/uL    Lymphocytes Absolute 1.10 0.80 - 3.00 x10*3/uL    Monocytes Absolute 1.29 (H) 0.05 - 0.80 x10*3/uL    Eosinophils Absolute 0.11 0.00 - 0.40 x10*3/uL    Basophils Absolute 0.05 0.00 - 0.10 x10*3/uL   Comprehensive Metabolic Panel   Result Value Ref Range    Glucose 139 (H) 74 - 99 mg/dL    Sodium 141 136 - 145 mmol/L    Potassium 4.2 3.5 - 5.3 mmol/L    Chloride 106 98 - 107 mmol/L    Bicarbonate 27 21 - 32 mmol/L    Anion Gap 12 10 - 20 mmol/L    Urea Nitrogen 15 6 - 23 mg/dL    Creatinine 0.67 0.50 - 1.05 mg/dL    eGFR 83 >60 mL/min/1.73m*2    Calcium 9.2 8.6 - 10.3 mg/dL    Albumin 3.1 (L) 3.4 - 5.0 g/dL    Alkaline Phosphatase 75 33 - 136 U/L    Total Protein 6.0 (L) 6.4 - 8.2 g/dL    AST 15 9 - 39 U/L    Bilirubin, Total 0.5 0.0 - 1.2 mg/dL    ALT 17 7 - 45 U/L   Troponin I, High Sensitivity   Result Value Ref Range    Troponin I, High Sensitivity 21 (H) 0 - 13 ng/L   Magnesium   Result Value Ref Range    Magnesium 1.63 1.60 - 2.40 mg/dL   B-type  natriuretic peptide   Result Value Ref Range     (H) 0 - 99 pg/mL   Urinalysis with Reflex Culture and Microscopic   Result Value Ref Range    Color, Urine Yellow Light-Yellow, Yellow, Dark-Yellow    Appearance, Urine Clear Clear    Specific Gravity, Urine 1.020 1.005 - 1.035    pH, Urine 5.5 5.0, 5.5, 6.0, 6.5, 7.0, 7.5, 8.0    Protein, Urine 20 (TRACE) NEGATIVE, 10 (TRACE), 20 (TRACE) mg/dL    Glucose, Urine Normal Normal mg/dL    Blood, Urine NEGATIVE NEGATIVE    Ketones, Urine 10 (1+) (A) NEGATIVE mg/dL    Bilirubin, Urine NEGATIVE NEGATIVE    Urobilinogen, Urine Normal Normal mg/dL    Nitrite, Urine NEGATIVE NEGATIVE    Leukocyte Esterase, Urine NEGATIVE NEGATIVE   Urinalysis Microscopic   Result Value Ref Range    WBC, Urine 1-5 1-5, NONE /HPF    RBC, Urine NONE NONE, 1-2, 3-5 /HPF    Squamous Epithelial Cells, Urine 1-9 (SPARSE) Reference range not established. /HPF    Hyaline Casts, Urine 1+ (A) NONE /LPF   Protime-INR   Result Value Ref Range    Protime 16.7 (H) 9.8 - 12.8 seconds    INR 1.5 (H) 0.9 - 1.1      Assessment/Plan   Principal Problem:    Personal history of PE (pulmonary embolism)      Acute PE, no RV strain. Continue heparin gtt, start Eliquis tomorrow. Goal is 3-6 months. Discussed with Pt's daughter risks and benefits of anticoagulation. Pt had 1 episode of GI bleeding 10 years ago. If starts bleeding, an IVC filter will be the next option.  Low BP. Hold diuretic, antihypertensives.  Code status - DNR/DNI.   DM 2 - Insulin SS, hypoglycemia protocol.  Dementia with behav. disturbances. Cont. medications.  Hx of Crohn's dis. Cont. meds.  Constipation -cont. meds.       Audrey Avilez MD

## 2024-04-13 NOTE — ED NOTES
[facility] at bedside for ground transport to [address].      Marlene Bautista RN  04/12/24 2050  Pt out of ED at 2055     Marlene Bautista RN  04/12/24 2059

## 2024-04-13 NOTE — PROGRESS NOTES
Occupational Therapy    Evaluation    Patient Name: Viktoriya Lyon  MRN: [id number]  Today's Date: 4/13/2024       General:  General  Missed Visit: Yes  Missed Visit Reason: Cancel  General Comment: +PE - currently on bedrest for anti-coagulation and then returning to ECF per nurse.

## 2024-04-16 LAB
ATRIAL RATE: 119 BPM
Q ONSET: 219 MS
QRS COUNT: 18 BEATS
QRS DURATION: 118 MS
QT INTERVAL: 308 MS
QTC CALCULATION(BAZETT): 411 MS
QTC FREDERICIA: 373 MS
R AXIS: -31 DEGREES
T AXIS: 146 DEGREES
T OFFSET: 373 MS
VENTRICULAR RATE: 107 BPM